# Patient Record
Sex: FEMALE | Race: WHITE
[De-identification: names, ages, dates, MRNs, and addresses within clinical notes are randomized per-mention and may not be internally consistent; named-entity substitution may affect disease eponyms.]

---

## 2017-06-25 ENCOUNTER — HOSPITAL ENCOUNTER (INPATIENT)
Dept: HOSPITAL 68 - ERH | Age: 47
LOS: 2 days | DRG: 607 | End: 2017-06-27
Attending: INTERNAL MEDICINE | Admitting: INTERNAL MEDICINE
Payer: COMMERCIAL

## 2017-06-25 VITALS — BODY MASS INDEX: 39.27 KG/M2 | HEIGHT: 64 IN | WEIGHT: 230 LBS

## 2017-06-25 VITALS — SYSTOLIC BLOOD PRESSURE: 158 MMHG | DIASTOLIC BLOOD PRESSURE: 98 MMHG

## 2017-06-25 DIAGNOSIS — E78.5: ICD-10-CM

## 2017-06-25 DIAGNOSIS — J45.909: ICD-10-CM

## 2017-06-25 DIAGNOSIS — N83.202: ICD-10-CM

## 2017-06-25 DIAGNOSIS — L40.9: ICD-10-CM

## 2017-06-25 DIAGNOSIS — F17.200: ICD-10-CM

## 2017-06-25 DIAGNOSIS — M54.16: ICD-10-CM

## 2017-06-25 DIAGNOSIS — E66.9: ICD-10-CM

## 2017-06-25 DIAGNOSIS — E11.9: ICD-10-CM

## 2017-06-25 DIAGNOSIS — R21: Primary | ICD-10-CM

## 2017-06-25 DIAGNOSIS — B37.3: ICD-10-CM

## 2017-06-25 DIAGNOSIS — Z79.4: ICD-10-CM

## 2017-06-25 DIAGNOSIS — I10: ICD-10-CM

## 2017-06-25 LAB
ABSOLUTE GRANULOCYTE CT: 11.1 /CUMM (ref 1.4–6.5)
ABSOLUTE GRANULOCYTE CT: 7.6 /CUMM (ref 1.4–6.5)
BASOPHILS # BLD: 0 /CUMM (ref 0–0.2)
BASOPHILS # BLD: 0 /CUMM (ref 0–0.2)
BASOPHILS NFR BLD: 0.1 % (ref 0–2)
BASOPHILS NFR BLD: 0.3 % (ref 0–2)
EOSINOPHIL # BLD: 0 /CUMM (ref 0–0.7)
EOSINOPHIL # BLD: 0.1 /CUMM (ref 0–0.7)
EOSINOPHIL NFR BLD: 0.1 % (ref 0–5)
EOSINOPHIL NFR BLD: 0.7 % (ref 0–5)
ERYTHROCYTE [DISTWIDTH] IN BLOOD BY AUTOMATED COUNT: 13 % (ref 11.5–14.5)
ERYTHROCYTE [DISTWIDTH] IN BLOOD BY AUTOMATED COUNT: 13.5 % (ref 11.5–14.5)
GRANULOCYTES NFR BLD: 81.8 % (ref 42.2–75.2)
GRANULOCYTES NFR BLD: 92.7 % (ref 42.2–75.2)
HCT VFR BLD CALC: 43.3 % (ref 37–47)
HCT VFR BLD CALC: 43.8 % (ref 37–47)
LYMPHOCYTES # BLD: 0.6 /CUMM (ref 1.2–3.4)
LYMPHOCYTES # BLD: 1 /CUMM (ref 1.2–3.4)
MCH RBC QN AUTO: 31.6 PG (ref 27–31)
MCH RBC QN AUTO: 31.6 PG (ref 27–31)
MCHC RBC AUTO-ENTMCNC: 33.9 G/DL (ref 33–37)
MCHC RBC AUTO-ENTMCNC: 34 G/DL (ref 33–37)
MCV RBC AUTO: 92.8 FL (ref 81–99)
MCV RBC AUTO: 93.3 FL (ref 81–99)
MONOCYTES # BLD: 0.2 /CUMM (ref 0.1–0.6)
MONOCYTES # BLD: 0.6 /CUMM (ref 0.1–0.6)
PLATELET # BLD: 161 /CUMM (ref 130–400)
PLATELET # BLD: 168 /CUMM (ref 130–400)
PMV BLD AUTO: 9.6 FL (ref 7.4–10.4)
PMV BLD AUTO: 9.6 FL (ref 7.4–10.4)
RED BLOOD CELL CT: 4.65 /CUMM (ref 4.2–5.4)
RED BLOOD CELL CT: 4.72 /CUMM (ref 4.2–5.4)
WBC # BLD AUTO: 12 /CUMM (ref 4.8–10.8)
WBC # BLD AUTO: 9.4 /CUMM (ref 4.8–10.8)

## 2017-06-25 PROCEDURE — 2NASP: CPT

## 2017-06-25 PROCEDURE — 86618 LYME DISEASE ANTIBODY: CPT

## 2017-06-25 NOTE — CT SCAN REPORT
EXAMINATION:
CT ABDOMEN AND PELVIS WITH CONTRAST
 
CLINICAL INFORMATION:
Left lower quadrant pain.
 
COMPARISON:
Previous CT scan July 2016 and pelvic ultrasound from earlier the same day.
 
TECHNIQUE:
Multidetector volumetric imaging was performed of the abdomen and pelvis
before and after the IV administration of 95 mL of Optiray 320 intravenous
contrast. Sagittal and coronal reformatted images were obtained on the
technologist's workstation.
 
DLP:
1005 mGy-cm.
 
FINDINGS:
 
LUNG BASES: The visualized lung bases are unremarkable.
 
LIVER, GALLBLADDER, AND BILIARY TREE: The liver is normal in size, shape, and
attenuation. No focal hepatic lesion or biliary ductal dilatation is present.
The gallbladder has been removed.
 
PANCREAS: Unremarkable.
 
SPLEEN: Unremarkable.
 
ADRENAL GLANDS: Unremarkable.
 
KIDNEYS AND URETERS: There is a 2.7 x 3.2 cm cyst exophytic to the upper pole
of the left kidney. The kidneys are otherwise unremarkable.
 
BLADDER: Unremarkable.
 
GASTROINTESTINAL TRACT: The small and large bowel are unremarkable. The
appendix is not identified with certainty. No evidence of appendicitis is
seen.
 
ABDOMINAL WALL: There is a small umbilical hernia containing fat.
 
LYMPH NODES: There are no enlarged lymph nodes seen. There is no ascites.
 
VASCULAR: Unremarkable.
 
PELVIC VISCERA: There is a heterogeneous predominantly high attenuation area
in the left adnexa measuring 2 x 2.5 cm probably corresponding to hemorrhagic
cyst seen on ultrasound. The uterus and adnexa are otherwise unremarkable.
 
OSSEOUS STRUCTURES: There is spondylolysis, grade 1 spondylolisthesis and
degenerative disc disease at L5-S1.
 
IMPRESSION:
There is a 2 x 2.5 cm heterogeneous high attenuation area in the left ovary
likely corresponding to a complex or hemorrhagic left ovarian cyst as seen by
ultrasound. No evidence of diverticulosis or diverticulitis. Left renal cyst.

## 2017-06-25 NOTE — ULTRASOUND REPORT
EXAMINATION:
ULTRASOUND OF THE PELVIS
 
CLINICAL INFORMATION:
46-year-old female presented with left lower quadrant pain. History of
ovarian cyst.
 
COMPARISON:
CT of the abdomen and pelvis done on 07/15/2016.
 
TECHNIQUE:
Transabdominal and transvaginal pelvic ultrasound.
 
FINDINGS:
The uterus is normal in size and appearance, measuring 7.4 x 3.7 x 4.5 cm cm
longitudinally, anteroposteriorly and transversely. The endometrial stripe
thickness is normal, measuring 0.5 centimeters in thickness. The cervical
length measures 2.8 cm. No focal myometrial mass is seen.
 
The right ovary measures 3.2 x 1.5 x 2.5 cm, volume of 6.3 mL, shows a
dominant solitary follicle measuring 1.5 x 1.1 x 1.4 cm.
 
The left ovary measures 3.6 x 2.0 x 2.6 cm a volume of 9.8 mL. Superimposed
well-circumscribed hypoechoic echogenicity is noted within the left ovary
showing low level uniform smooth homogenous echoes measuring 2.4 x 1.4 x 2.3
cm, most consistent with hemorrhagic ovarian follicle.
 
Vascular flow to both ovaries is well maintained.
 
There is no free fluid present.
 
A transvaginal study was performed in addition to the transabdominal study
which did not yield an adequate examination of the uterus and ovaries due to
superimposed distended gas-filled loops of bowel.
 
IMPRESSION:
1. Sonographically unremarkable uterus and normal-appearing right ovary.
 
2. Likely hemorrhagic follicle involving the left ovary. The size of the left
ovary appears relatively stable since 07/16/2016.
 
3. No free fluid.

## 2017-06-25 NOTE — HISTORY & PHYSICAL
SINAN LOWE 17:
General Information and HPI
MD Statement:
I have seen and personally examined NARENDRA OBRIEN and documented this H&
P.
 
The patient is a 46 year old F who presented with a patient stated chief 
complaint of abdominal pain and rash
 
Source of Information: patient
Exam Limitations: no limitations
History of Present Illness:
46 year old woman, current everyday smoker with Pmh significant for DM, 
recurrent breast infections usually treated with Doxycline, history left ovarian
cyst, multiple allergies, psoriasis comes to ED for evaluation for Abdominal 
pain and rash.
 
Around 8pm she started experiencing  non radiating LLQ abdominal pain which was 
10/10 and after administration of motrin decreased to 8/10. Was associated with 
nausea.  Also around the same time she noticed a rash on her lower abdomen which
during her ER stay was noted to spread upwards to mid chest. Rash  was non 
pruritic and associated with burning.  Endorses white vaginal discharge which 
started today as well. 
 
Denies shortness of breath, chest pain, palpitatations, vomiting, change in diet
or detergent, excessive menstrual bleeding,  reports spotting in between her 
periods.
 
 
Of note  which was done in  was complicated infection of the 
surgical site. She is followed by Dr. Fonseca for her current left ovarian cyst 
and she has had prior removal of what she states was a 9 cm ovarian cyst in the 
left ovary. 
 
Her only change in her medications was Onglyza which she recently re-started 3 
months ago after stopping for over a year ago during her pregnancy.
 
Allergies/Medications
Allergies:
Coded Allergies:
Citrus And Derivatives (Severe, ANAPHYLAXIS 16)
ciprofloxacin (From CIPRO HC) (Severe, ANAPHYLAXIS 16)
hydrocortisone (From CIPRO HC) (Severe, ANAPHYLAXIS 16)
Macrolide Antibiotics (UNKNOWN 16)
adhesive tape (BLISTERS 16)
cefadroxil (UNKNOWN 16)
clindamycin (HIVES 16)
erythromycin base (UNKNOWN 16)
penicillin G (UNKNOWN 16)
pioglitazone (RASH 16)
sitagliptin (From JANUVIA) (RASH 16)
sulfamethoxazole (UNKNOWN 16)
venom-honey bee (BEE VENOM (HONEY BEE)) (HIVES 16)
morphine (NAUSEA 16)
 
Home Med list
Doxycycline Hyclate 100 MG TABLET   1 TAB PO BID rash
Fluconazole (Diflucan) 150 MG TABLET   1 TAB PO ONCE yeast infection
Glipizide (Glipizide ER) 5 MG TAB.ER.24   2 TAB PO QPM DIABETES  (Reported)
Hydrocortisone 1 % CREAM..G.   1 KENDRA TOP BID rash
     apply to affected area(s)
Lisinopril (Prinivil) 5 MG TABLET   1 TAB PO DAILY HEART  (Reported)
Metformin HCl (Metformin HCl ER) 500 MG TAB.ER.24H   2 TAB PO BID DIABETES  (
Reported)
Oxycodone HCl/Acetaminophen (Percocet 5-325 MG Tablet) 5 MG-325 MG TABLET   1 
TAB PO BID PRN pain
Saxagliptin (Onglyza) 5 MG TABLET   1 TAB PO QPM DIABETES  (Reported)
Simvastatin (Simvastatin*) 40 MG TABLET   1 TAB PO QPM CHOLESTEROL  (Reported)
 
Compliance With Home Meds: GOOD
 
Past History
 
Travel History
Traveled to [place] past 21 day No
 
Medical History
Blood Transfusion Hx: No
Neurological: NONE
EENT: NONE
Cardiovascular: hypertension
Respiratory: asthma
Gastrointestinal: NONE
Hepatic: NONE
Renal: NONE
Musculoskeletal: NONE
Psychiatric: NONE
Endocrine: diabetes
Cancer(s): RT BREAST LUMPECTOMY
GYN/Reproductive: OVARIAN CYSTS
 
Surgical History
Surgical History: , CYST REMOVAL
 
Past Family/Social History
 
Family History
Relations & Conditions if any
Relation not specified for:
  Diabetes mellitus in father
 
 
Psychosocial History
Where do you live? Home
Who Do You Live With? spouse
Services at Home: None
Smoking Status: Current Everyday Smoker
ETOH Use: denies use
 
Functional Ability
ADLs
Independent: dressing, eating, toileting, bathing. 
Ambulation: independent
IADLs
Independent: shopping, housework, finances, food prep, telephone, transportation
, medication admin. 
 
Review of Systems
 
Review of Systems
Constitutional:
Denies: see HPI, chills, diaphoresis, fever, malaise, weakness, unexplained 
weight loss. 
Cardiovascular:
Denies: chest pain, edema, orthopena, palpitations, peripheral edema, syncope. 
Respiratory:
Denies: cough, hemoptysis, orthopnea, short of breath, sputum production, 
stridor, wheezing. 
GI:
Reports: abdominal pain.  Denies: bloating, constipation, diarrhea, distention, 
bowel incontinence, melena, nausea, bloody stool, changes in stool, vomiting, 
steatorrhea. 
Genitourinary:
Denies: discharge, dysuria, frequency, hematuria, hesitation, nocturia, pain, 
urgency. 
Skin:
Reports: rash. 
 
Exam & Diagnostic Data
Last 24 Hrs of Vital Signs/I&O
 Vital Signs
 
 
Date Time Temp Pulse Resp B/P B/P Pulse O2 O2 Flow FiO2
 
     Mean Ox Delivery Rate 
 
 2314      96 Room Air  
 
 2234 98.1 107 20 158/98  96 Room Air  
 
 2157       Room Air  
 
 2048 97.2 94 18 142/93  95 Room Air Room Air 
 
 1700 99.1 99 22 138/72  100 Room Air  
 
 1500  100 22 132/70  99   
 
 1259 98.9 109 16 135/78  94 Room Air  
 
 1017 99.7 110 18 153/84  97 Room Air  
 
 
 Intake & Output
 
 
  0800  0000  1600
 
Intake Total  240 
 
Output Total   
 
Balance  240 
 
    
 
Intake, Oral  240 
 
Patient  230 lb 230 lb
 
Weight   
 
Weight  Reported by Patient Reported by Patient
 
Measurement   
 
Method   
 
 
 
 
Physical Exam
General Appearance Alert, Oriented X3, Cooperative, Mild Distress
Skin red, blanchable maculopapular rash extending over abdomen upto chest , 
psoriatic lesions on b/l lower extremities
Neck Supple
Cardiovascular Regular Rate, Normal S1, Normal S2
Lungs Clear to Auscultation, Normal Air Movement
Abdomen Normal Bowel Sounds, tenderness to mild palpation, worse in LLQ with 
guarding
Extremities No Edema
 
Diagnostic Data
Other Results
US-TRANSVAGINAL
FINDINGS:
The uterus is normal in size and appearance, measuring 7.4 x 3.7 x 4.5 cm cm
longitudinally, anteroposteriorly and transversely. The endometrial stripe
thickness is normal, measuring 0.5 centimeters in thickness. The cervical
length measures 2.8 cm. No focal myometrial mass is seen.
 
The right ovary measures 3.2 x 1.5 x 2.5 cm, volume of 6.3 mL, shows a
dominant solitary follicle measuring 1.5 x 1.1 x 1.4 cm.
 
The left ovary measures 3.6 x 2.0 x 2.6 cm a volume of 9.8 mL. Superimposed
well-circumscribed hypoechoic echogenicity is noted within the left ovary
showing low level uniform smooth homogenous echoes measuring 2.4 x 1.4 x 2.3
cm, most consistent with hemorrhagic ovarian follicle.
 
Vascular flow to both ovaries is well maintained.
 
There is no free fluid present.
 
A transvaginal study was performed in addition to the transabdominal study
which did not yield an adequate examination of the uterus and ovaries due to
superimposed distended gas-filled loops of bowel.
 
IMPRESSION:
1. Sonographically unremarkable uterus and normal-appearing right ovary.
 
2. Likely hemorrhagic follicle involving the left ovary. The size of the left
ovary appears relatively stable since 2016.
 
3. No free fluid.
 
 CT ABD & PELVIS W IV CONTRAST
 
FINDINGS:
 
LUNG BASES: The visualized lung bases are unremarkable.
 
LIVER, GALLBLADDER, AND BILIARY TREE: The liver is normal in size, shape, and
attenuation. No focal hepatic lesion or biliary ductal dilatation is present.
The gallbladder has been removed.
 
PANCREAS: Unremarkable.
 
SPLEEN: Unremarkable.
 
ADRENAL GLANDS: Unremarkable.
 
KIDNEYS AND URETERS: There is a 2.7 x 3.2 cm cyst exophytic to the upper pole
of the left kidney. The kidneys are otherwise unremarkable.
 
BLADDER: Unremarkable.
 
GASTROINTESTINAL TRACT: The small and large bowel are unremarkable. The
appendix is not identified with certainty. No evidence of appendicitis is
seen.
 
ABDOMINAL WALL: There is a small umbilical hernia containing fat.
 
LYMPH NODES: There are no enlarged lymph nodes seen. There is no ascites.
 
VASCULAR: Unremarkable.
 
PELVIC VISCERA: There is a heterogeneous predominantly high attenuation area
in the left adnexa measuring 2 x 2.5 cm probably corresponding to hemorrhagic
cyst seen on ultrasound. The uterus and adnexa are otherwise unremarkable.
 
OSSEOUS STRUCTURES: There is spondylolysis, grade 1 spondylolisthesis and
degenerative disc disease at L5-S1.
 
IMPRESSION:
There is a 2 x 2.5 cm heterogeneous high attenuation area in the left ovary
likely corresponding to a complex or hemorrhagic left ovarian cyst as seen by
ultrasound. No evidence of diverticulosis or diverticulitis. Left renal cyst.
 
Assessment/Plan
Assessment:
46 year old woman, current everyday smoker with Pmh significant for DM, 
recurrent breast infections usually treated with Doxycline, history left ovarian
cyst, multiple allergies, psoriasis comes to ED for evaluation for Abdominal 
pain and rash. Labs significant for mildly elevated leukcocytosis, elevated 
serum glucose and glycosuria.  CT ABD/pelvis pertinant for 2 x 2.5cm left 
hemorrhagic cyst confirming finding on Transvaginal US. 
 
ED course: received one dose of Doxycline, IV solumedrol and Diflucan.
 
As Ranked By This Provider
Problem List:
 1. Hemorrhagic cyst of left ovary
   Assessment/Plan
Called and spoke to Dr. Boudreaux the on call OBG/GYN who reviewed Imaging. 
Recomended to continue conservative management and follow up with Dr. Fonseca upon
discharge. 
Monitor for acute abdomen. 
 
 2. Rash and nonspecific skin eruption
   Assessment/Plan
unclear etiology at this time, clinical examination not impressive for 
cellulitis, improvement noted after administration of IV solumedrol.
Will continue with scheduled Benadryl 50mg q6, monitor progress
Dermatologic consult to be placed in AM
In view of her past history recurrent infections, pt is anxious not to be on 
Antibiotics will continue with cefazolin pending blood cultures
 
 3. Diabetes mellitus type 2
   Assessment/Plan
accuchecks, Novolog SS
hold oral hypoglycemics
f/up HA1c
continue lisinopril and statin 
Diabetic diet
 
 
 
 4. DVT prophylaxis
   Assessment/Plan
sc lovenox
 
 5. Full code status
 
 
Core Measures/Miscellaneous
 
Acute Coronary Syndrome
ACS Diagnosis: No
 
Cerebrovascular Accident
CVA/TIA Diagnosis: No
 
Congestive Heart Failure
CHF Diagnosis: No
 
VTE (View Protocol)
VTE Risk Factors: Age > 40
[facility] d/t: No contraindications
No VTE Pharm Prophylaxis d/t: No contraindications
VTE Diagnosis: No
VTE Type: NONE
VTE Confirmed by (Test): NONE
 
Sepsis (View Protocol)
Severe Sepsis Present: No
 
Septic Shock
Septic Shock Present: No
 
Miscellaneous Documentation
Attending Case Discussed With:
ROSA VICKERS DO
 
Primary Care Physician:
JODI BARRERA MD
 
Patient sees these Specialists
Dr. Manas Fonseca
 
Level of Patient Care: General Medicine
 
 
LEOPOLDO NDIAYE MD 17 0317:
Resident Review Statement
Resident Statement: examined this patient, discussed with intern, agreed with 
intern, discussed with family
Other Findings:
46-year-old female with past medical history of psoriasis multiple drug and 
seasonal allergies, diabetes, hypertension and left ovarian cyst here with 
complains of sudden onset of severe, left-sided lower abdominal pain and a new 
painful red rash on her lower abdomen x 1 day and a copious clear-colored 
vaginal discharge and vaginal itching that started today.  She endorses low-
grade fevers, chills, and sweats. She reports that since ariving in the ER, the 
rash rapidly spread up across abdomen up to the middle of the breasts but became
a little better after she was given IV steroids.
 
She also asked that she has had chronic sinus and congestion for the past 3 
months and now has a cough productive of white and yellow sputum for the last 5 
days with associated wheezing without chest pain or shortness of breath.
 
She had a  section was complicated by an infection of the surgical site.
 She denies a hx of endometritis. Last year and reports that a large cyst was 
taken out of her left ovary prior to her last pregnancy, and she currently has a
cyst in her left ovary that has been there throughout her pregnancy for which 
her OB/GYN Dr. Sewell has been monitoring it. She had no pain prior to now and 
she does not believe there was any blood in the cyst prior.  She has had 
multiple breast infections in the past requiring surgical interventions as well 
as vaginal yeast infections in the past.
 
She takes Onglyza which she recently re-started 3 months ago after stopping for 
over a year ago when she was pregnant, metformin and glipizide for her diabetes 
and follows up with Dr. Malagon. Her blood sugars have been well controlled until 
yesterday when they were elevated.
 
Transvaginal ultrasound scan:
1. Sonographically unremarkable uterus and normal-appearing right ovary.
2. Likely hemorrhagic follicle involving the left ovary. The size of the left
ovary appears relatively stable since 2016.
3. No free fluid.
 
CT abdomen pelvis with IV contrast:
There is a 2 x 2.5 cm heterogeneous high attenuation area in the left ovary
likely corresponding to a complex or hemorrhagic left ovarian cyst as seen by
ultrasound. No evidence of diverticulosis or diverticulitis. Left renal cyst.
 
Assessment
1.  Possible Drug allergy versus cellulitis
2.  Vaginal candidiasis
3.  Possible Bronchitis
4.  Hemorrhagic transformation of left Ovarian cyst
4.  T2 DM
5.   HTN
 
 
Plan
Admit to general medicine floor
Blood cultures 2
IV cefazolin 1 g every 8hrs
Sputum culture
TRC nebs
By mouth Benadryl 50 mg every 6 hours
Hold steroids for now-patient received 125 mg of IV Solu-Medrol in the ER
Dermatology consult
Monitor CBCs
Hold all her oral hypoglycemics for now
Subcutaneous Levemir 10 mg twice a day-may titrate upwards as needed
NovoLog sliding scale
Fingersticks glucose 3 times a day before meals and at bedtime
Consider Endo consult if blood sugar becomes difficult to control
Check hemoglobin A1c
Adequate pain control
Full code
Subcutaneous Lovenox for DVT prophylaxis
 
 
ARGELIA FREEMAN 17 0431:
Attending MD Review Statement
 
Attending Statement
Attending MD Statement: examined this patient, discuss w/resident/PA/NP, agreed 
w/resident/PA/NP, discussed with family, reviewed EMR data (avail), reviewed 
images, amended to note
Attending Assessment/Plan:
 
CC: pain in left "ovary"
PMH: DM, HLD, psoriasis, asthma, migraine, recurrent breast infection
 
Patient came to ER complaining of pain in the left-sided ovary. Upon further 
conditioning she complains of pain in left lower quadrant, sharp, radiating to 
center of lower abdomen. She noticed pain last night, which worsened the morning
of admission so she came to ER. She endorses some nausea associated with that 
but denies any vomiting. Then she noticed rash on lower abdomen, patient was 
kept in ER in observation for the day and eventually rash worsened spreading 
upwards beyond the umbilicus up to the sternum. Patient complains severe burning
sensation over the rash, denies any itching. "I feel the cellulitis, this is not
allergy". She endorses chills but no subjective or objective fevers. She also 
noticed white vaginal discharge referring to fungal infection. She has several 
pictures taken on her cell phone poor progression of the rash. 
Vitals: Afebrile, BP max 99.7, , RR 18, blood pressure 135/78, saturating 
well on room air.
 
On examination: A O 3, cooperative, no acute distress, neck supple, JVD normal,
no inguinal lymphadenopathy, mucosa moist, no focal neurological deficit, no 
dependent edema, so reticulocyte lesions bilateral lower extremity, 
maculopapular blanching rash lower abdomen spreading up to the sternum, upper 
part of the rash appears resolving. Mildly increased temperature, no induration.
No other rashes observed, CVS: S1-S2, RRR. RS: Minimum wheezing. abdomen: Soft, 
mild tenderness, obese, no guarding or rigidity, tender to touch in left 
inguinal region, ND, bowel sounds present. 
 
Labs: WBC 12.0, neutrophils 92%, sodium 135, potassium 4.2, chloride 100, 
bicarbonate 27, anion gap 8, glucose 313, AST 12, lipase 220, lactate 1.1
 
UA unremarkable except high glucose
Transvaginal and transabdominal ultrasound: 
1. Sonographically unremarkable uterus and normal-appearing right ovary. 
2. Likely hemorrhagic follicle involving the left ovary. The size of the left 
ovary appears relatively stable since 2016. 
3. No free fluid.
 
CT abdo and pelvis : 
There is a 2 x 2.5 cm heterogeneous high attenuation area in the left ovary 
likely corresponding to a complex or hemorrhagic left ovarian cyst as seen by 
ultrasound. No evidence of diverticulosis or diverticulitis. Left renal cyst. 
 
A and P 
 
46 year old female with history of diabetes mellitus and psoriasis presented in 
ER for sharp left-sided lower abdominal p a in. Patient hemodynamically stable. 
She states that pain is severe, does not allow much examination, appears tender 
to touch but no guarding or rigidity. Transvaginal ultrasound and CT scan were 
obtained which showed possible hemorrhagic transformation or existing ovarian 
cyst. Because of her severe pain OB/GYN was called, who suggested conservative 
management. At the same time patient noticed rash on lower abdomen starting 
below umbilicus spreading upwards, rashes maculopapular, blanching. Questionable
allergy less likely infection/ cellulitis.  It does not appear to be purpuric.  
Less likely other chronic inflammations as rash is very acute in nature.  
Patient states that she gets recurrent cellulitis and had infection of her 
breast 11 times, infection of her  incision. She insist on getting 
antibiotics. She also complains of vaginal discharge and received 1 dose of 
fluconazole in ER. She also received ketorolac, Tylenol, doxycycline, 
methylprednisolone 125 mg, normal saline in ER.  Patient is admitted to general 
medicine, for worsening abdominal rash. We will continue cefazolin, scheduled 
Benadryl, dermatologic consult, follow-up blood cultures, hold all her oral 
hypoglycemics, continue sliding scale insulin, continue lisinopril.

## 2017-06-25 NOTE — ED GI/GU/ABDOMINAL COMPLAINT
History of Present Illness
 
General
Chief Complaint: Abdominal Pain/Flank Pain
Stated Complaint: ABD PAIN, REDNESS AND SWELLING
Source: patient, family
Exam Limitations: no limitations
 
Vital Signs & Intake/Output
Vital Signs & Intake/Output
 Vital Signs
 
 
Date Time Temp Pulse Resp B/P B/P Pulse O2 O2 Flow FiO2
 
     Mean Ox Delivery Rate 
 
 1131       Room Air  
 
 1130      96 Room Air  
 
 0815  101  142/90     
 
 0800       Room Air  
 
 0720 98.0 101 20 142/90  97 Room Air  
 
 2314      96 Room Air  
 
 2234 98.1 107 20 158/98  96 Room Air  
 
 2157       Room Air  
 
 2048 97.2 94 18 142/93  95 Room Air Room Air 
 
 1700 99.1 99 22 138/72  100 Room Air  
 
 1500  100 22 132/70  99   
 
 1259 98.9 109 16 135/78  94 Room Air  
 
 
 ED Intake and Output
 
 
  0000  1200
 
Intake Total 240 
 
Output Total  
 
Balance 240 
 
   
 
Intake, Oral 240 
 
Patient 230 lb 230 lb
 
Weight  
 
Weight Reported by Patient Reported by Patient
 
Measurement  
 
Method  
 
 
Allergies
Coded Allergies:
Citrus And Derivatives (Severe, ANAPHYLAXIS 16)
ciprofloxacin (From CIPRO HC) (Severe, ANAPHYLAXIS 16)
hydrocortisone (From CIPRO HC) (Severe, ANAPHYLAXIS 16)
Macrolide Antibiotics (UNKNOWN 16)
adhesive tape (BLISTERS 16)
cefadroxil (UNKNOWN 16)
clindamycin (HIVES 16)
erythromycin base (UNKNOWN 16)
penicillin G (UNKNOWN 16)
pioglitazone (RASH 16)
sitagliptin (From JANUVIA) (RASH 16)
sulfamethoxazole (UNKNOWN 16)
venom-honey bee (BEE VENOM (HONEY BEE)) (HIVES 16)
morphine (NAUSEA 16)
 
Reconcile Medications
Doxycycline Hyclate 100 MG TABLET   1 TAB PO BID rash
Fluconazole (Diflucan) 150 MG TABLET   1 TAB PO ONCE yeast infection
Glipizide (Glipizide ER) 5 MG TAB.ER.24   2 TAB PO QPM DIABETES  (Reported)
Hydrocortisone 1 % CREAM..G.   1 KENDRA TOP BID rash
     apply to affected area(s)
Lisinopril (Prinivil) 5 MG TABLET   1 TAB PO DAILY HEART  (Reported)
Metformin HCl (Metformin HCl ER) 500 MG TAB.ER.24H   2 TAB PO BID DIABETES  (
Reported)
Oxycodone HCl/Acetaminophen (Percocet 5-325 MG Tablet) 5 MG-325 MG TABLET   1 
TAB PO BID PRN pain
Saxagliptin (Onglyza) 5 MG TABLET   1 TAB PO QPM DIABETES  (Reported)
Simvastatin (Simvastatin*) 40 MG TABLET   1 TAB PO QPM CHOLESTEROL  (Reported)
 
Triage Note:
47 YO FEMALE TO ER C/O L SIDED OVARY PAIN AND PAIN
 TO CENTER OF LOWER ABD (NOTED WITH REDNESS TO ABD)
 PER PT, THE ABD IS PAINFUL TO TOUCH. +NAUSEA,
 DENIES V/D.
Triage Nurses Notes Reviewed? yes
LMP (ages 10-50): MAY
Onset: Abrupt
Duration: day(s):
Timing: recent history
Quality/Severity: aching, severe, throbbing
Severity Numbers: 10
Location: left lower quadrant
HPI:
47yo female with hx of endometreitis follow  last year, left ovarian 
cyst, and DM presents to ED complaining of left lower abdominal pain since last 
night. Patient describes pain as constant, trobbing, aching, and severe at 10/
10. She states this pain feels similar to pain she had with endometritis.  She 
took Motrin last night without relief. She also complains of spreading rash 
across lower abdomen, worse since last night. She also c/o labia pruritis which 
feels similar to previous yeast infections with thick white vaginal discharge. 
Her LMP was May 14th. LBM was yesterday and normal.  She denies fevers, chills, 
dyspnea, diarrhea, constipation, dysuria.
(YA RANKIN PA-C)
Pregnant? N
Is pt currently breastfeeding? No
(ROSA VICKERS DO)
 
Past History
 
Travel History
Traveled to [place] past 21 day No
 
Medical History
Any Pertinent Medical History? see below for history
Neurological: NONE
EENT: NONE
Cardiovascular: hypertension
Respiratory: asthma
Gastrointestinal: NONE
Hepatic: NONE
Renal: NONE
Musculoskeletal: NONE
Psychiatric: NONE
Endocrine: diabetes
 
Surgical History
Surgical History: 
 
Psychosocial History
Who do you live with Spouse
What is your primary language English
Tobacco Use: Current Daily Use
Daily Tobacco Use Amount/Type: => 5 Cigarettes daily
 
Family History
Hx Contributory? No
(YA RANKIN PA-C)
 
Review of Systems
 
Review of Systems
Constitutional:
Reports: no symptoms. 
EENTM:
Reports: no symptoms. 
Respiratory:
Reports: no symptoms. 
Cardiovascular:
Reports: no symptoms. 
GI:
Reports: see HPI. 
Genitourinary:
Reports: see HPI. 
Musculoskeletal:
Reports: no symptoms. 
Skin:
Reports: see HPI. 
Neurological/Psychological:
Reports: no symptoms. 
Hematologic/Endocrine:
Reports: no symptoms. 
Immunologic/Allergic:
Reports: no symptoms. 
All Other Systems: Reviewed and Negative
(YA RANKIN PA-C)
 
Physical Exam
 
Physical Exam
General Appearance: well developed/nourished, no apparent distress, alert, awake
, anxious
Head: atraumatic, normal appearance
Eyes:
Bilateral: normal appearance, EOMI. 
Ears, Nose, Throat, Mouth: hearing grossly normal
Neck: normal inspection, supple, full range of motion
Respiratory: normal breath sounds, no respiratory distress, lungs clear
Cardiovascular: regular rate/rhythm, tachycardia
Gastrointestinal: normal bowel sounds, soft, no organomegaly, tenderness LLQ
Back: normal inspection, no cva tenderness bilaterally
Extremities: normal range of motion
Neurologic/Psych: awake, alert, oriented x 3
Skin: erythematous macular papular rash on lower abdomen, slight warmth to tough
, blanches with pressure
(YA RANKIN PA-C)
 
Core Measures
ACS in differential dx? No
Severe Sepsis Present: No
Septic Shock Present: No
(ROSA VICKERS DO)
 
Progress
Differential Diagnosis: appendicitis, bowel obstruction, diverticulitis, ovarian
cyst, ovarian torsion, pancreatitis, CELLULITIS, PSORIASIS, VASCULITIS, TICK 
BORNE ILLNESS, NECROTIZING FASCIITIS
Plan of Care:
 Orders
 
 
Procedure Date/time Status
 
Consistent Carbohydrate 3  B Active
 
RT: Evaluation  1131 Active
 
LOWER RESPIRATORY CULTURE  1000 Active
 
CULTURE,URINE  0721 Active
 
URINALYSIS  0721 Complete
 
GLYCOSYLATED HGB  0600 Complete
 
CBC WITHOUT DIFFERENTIAL  0600 Complete
 
BASIC ELECTROLYTES PLUS BUN&CR  06 Complete
 
THERAPIST ORDERS   UNK Complete
 
Lab Add-on Test   UNK Active
 
Heart Healthy Diet  D Complete
 
Pathway - chart  2201 Active
 
Teach/Educate 2147 Active
 
Pain Treatment and Response 2147 Active
 
Nutritional Intake, Monitor 2147 Active
 
Isolation 2147 Active
 
Patient Care Conference 06/25 2147 Active
 
Activity/Ambulation  2147 Active
 
LACTIC ACID  2039 Complete
 
Patient Data 2003 Active
 
Admit to inpatient  1950 Active
 
FingerStick- Glucose  1739 Complete
 
LACTIC ACID  1739 Complete
 
CBC WITHOUT DIFFERENTIAL  1739 Complete
 
Patient Data  1542 Active
 
Vital Signs  1542 Active
 
Code Status  1542 Active
 
Intake & Output  1540 Active
 
Add-on Test (ER Only)  1442 Active
 
LYME TITRE  1039 Active
 
FingerStick- Glucose  1035 Active
 
AEROSOL CHG   UNK Complete
 
TRC EVALUATION (GEN)   UNK Complete
 
House Staff   UNK Active
 
VTE Mechanical Prophylaxis   UNK Active
 
 
 Current Medications
 
 
  Sig/Luz Elena Start time  Last
 
Medication Dose  Stop Time Status Admin
 
Atorvastatin Calcium 20 MG 1700  1700 AC 
 
(Lipitor)     
 
Albuterol Sulfate 3 ML Q4P PRN  1130 AC 
 
(Proventil)     1128
 
Enoxaparin Sodium 40 MG DAILY  1000 AC 
 
(Lovenox)     
 
Lisinopril 5 MG DAILY  1000 AC 
 
(Prinivil)     0815
 
Insulin Aspart 0 TIDAC  0800 AC 
 
(NovoLOG)     0817
 
Cefazolin Sodium 1,000 MG IQ8  0000 AC 
 
(Kefzol-Ancef Inj)     0816
 
Guaifenesin 600 MG Q12  2231 AC 
 
(Mucinex)     0814
 
Diphenhydramine HCl 50 MG Q6 PRN  2230 AC 
 
(Benadryl)     2243
 
Doxycycline Hyclate 100 MG ONCE ONE  2215 CAN 
 
(Vibramycin)    2320  
 
Sodium Chloride 100 ML    
 
(Normal Saline 0.9%)     
 
Sodium Chloride 1,000 ML Q20H  2215 AC 
 
(Normal Saline 0.9%)    1814  2242
 
Insulin Detemir 10 UNITS BID  2201 AC 
 
(Levemir)     0816
 
Acetaminophen 650 MG Q6P PRN  2200 AC 
 
(Tylenol)     
 
Acetaminophen 1,000 MG Q6P PRN  2200 AC 
 
(Ofirmev)     0925
 
Ketorolac  15 MG Q6P PRN  2200 AC 
 
Tromethamine     0815
 
(Toradol)     
 
 
 Laboratory Tests
 
 
 
17 1000:
Urine Color YEL, Urine Clarity CLEAR, Urine pH 6.0, Ur Specific Gravity 1.020, 
Urine Protein NEG, Urine Ketones 40  H, Urine Nitrite NEG, Urine Bilirubin NEG, 
Urine Urobilinogen 0.2, Ur Leukocyte Esterase NEG, Ur Microscopic EXAM NOT 
REQUIRED, Urine Hemoglobin NEG, Urine Glucose >=1000  H
 
17 0710:
Anion Gap 14, Estimated GFR > 60, BUN/Creatinine Ratio 24.0, Hemoglobin A1c 10.3
 H, CBC w Diff NO MAN DIFF REQ, RBC 4.80, MCV 93.9, MCH 31.7  H, RDW 13.1, MPV 
10.0, Gran % 89.6  H, Lymphocytes % 7.6  L, Monocytes % 2.8, Eosinophils % 0, 
Basophils % 0  L, Absolute Granulocytes 10.9  H, Absolute Lymphocytes 0.9  L, 
Absolute Monocytes 0.3, Absolute Eosinophils 0, Absolute Basophils 0, PUBS MCHC 
33.7
 
17 2102:
Lactic Acid 0.9
 
17 1900:
Lactic Acid 1.1, CBC w Diff NO MAN DIFF REQ, RBC 4.72, MCV 92.8, MCH 31.6  H, 
RDW 13.0, MPV 9.6, Gran % 92.7  H, Lymphocytes % 5.1  L, Monocytes % 2.0, 
Eosinophils % 0.1, Basophils % 0.1, Absolute Granulocytes 11.1  H, Absolute 
Lymphocytes 0.6  L, Absolute Monocytes 0.2, Absolute Eosinophils 0, Absolute 
Basophils 0, PUBS MCHC 34.0
 Microbiology
 1000  URINE ROUT: Urine Culture - RECD
 1000  LOWER RESP: Respiratory Culture - COLB
 1000  LOWER RESP: Gram Stain - COLB
2255  LOWER RESP: Respiratory Culture - CAN
                Cancelled: NUMBER OF SQUAMOUS CELLS INDICATES POOR QUALITY 
SPECIMEN
2255  LOWER RESP: Gram Stain - CAN
                Cancelled: NUMBER OF SQUAMOUS CELLS INDICATES POOR QUALITY 
SPECIMEN
 
 
Patient given IV toradol and states improvement in her abdominal pain. 
Transvaginal ultrasound and CT abdomen/pelvis both show hemorrhagic cyst in left
ovary which may account for patient's abdominal pain. 
 
Upon further exam rash spreading further upward on patient's abdomen with some 
patches on chest now. Rash on upper abdomen and chest appears erythematous and 
lacy, nontender to palpation. Patient was discussed with Dr. Vickers. Decision for
ED obs after discussed with case management. Patient was started on IV doxy and 
IV solumedrol and lactic acid drawn.
 
Surgery PA consulted patient at bedside, suspicion for necretizing faciitis is 
low given patient's vital signs, she is afebrile, her WBCs are WNL, however not 
safe to discharge patient at this time with spreading rash.
 
Patient given oral diflucan for vaginal candidiasis.
 
Decision to admit made by Dr. Vickers and Dr. Hinkle based on patient's 
continue symptoms despite IV doxycyline and IV solumedrol.
(MASSIEL SUNG,YA)
Diagnostic Imaging:
Viewed by Me: CT Scan, Ultrasound.  Discussed w/RAD: CT Scan, Ultrasound. 
Radiology Impression: PATIENT: NARENDRA OBRIEN  MEDICAL RECORD NO: [id number] 
PRESENT AGE: 46  PATIENT ACCOUNT NO: [id number] : 70  LOCATION: [facility] 
ORDERING PHYSICIAN: YA RANKIN PA-C     SERVICE DATE:  EXAM TYPE: 
CAT - CT ABD & PELVIS W IV CONTRAST EXAMINATION: CT ABDOMEN AND PELVIS WITH 
CONTRAST CLINICAL INFORMATION: Left lower quadrant pain. COMPARISON: Previous CT
scan 2016 and pelvic ultrasound from earlier the same day. TECHNIQUE: 
Multidetector volumetric imaging was performed of the abdomen and pelvis before 
and after the IV administration of 95 mL of Optiray 320 intravenous contrast. 
Sagittal and coronal reformatted images were obtained on the technologist's 
workstation. DLP: 1005 mGy-cm. FINDINGS: LUNG BASES: The visualized lung bases 
are unremarkable. LIVER, GALLBLADDER, AND BILIARY TREE: The liver is normal in 
size, shape, and attenuation. No focal hepatic lesion or biliary ductal 
dilatation is present. The gallbladder has been removed. PANCREAS: Unremarkable.
SPLEEN: Unremarkable. ADRENAL GLANDS: Unremarkable. KIDNEYS AND URETERS: There 
is a 2.7 x 3.2 cm cyst exophytic to the upper pole of the left kidney. The 
kidneys are otherwise unremarkable. BLADDER: Unremarkable. GASTROINTESTINAL 
TRACT: The small and large bowel are unremarkable. The appendix is not 
identified with certainty. No evidence of appendicitis is seen. ABDOMINAL WALL: 
There is a small umbilical hernia containing fat. LYMPH NODES: There are no 
enlarged lymph nodes seen. There is no ascites. VASCULAR: Unremarkable. PELVIC 
VISCERA: There is a heterogeneous predominantly high attenuation area in the 
left adnexa measuring 2 x 2.5 cm probably corresponding to hemorrhagic cyst seen
on ultrasound. The uterus and adnexa are otherwise unremarkable. OSSEOUS 
STRUCTURES: There is spondylolysis, grade 1 spondylolisthesis and degenerative 
disc disease at L5-S1. IMPRESSION: There is a 2 x 2.5 cm heterogeneous high 
attenuation area in the left ovary likely corresponding to a complex or 
hemorrhagic left ovarian cyst as seen by ultrasound. No evidence of 
diverticulosis or diverticulitis. Left renal cyst. DICTATED BY: CINDY EMMANUEL MD  DATE/TIME DICTATED:17 :RAD.PRATHER  DATE/TIME 
TRANSCRIBED:17 CONFIDENTIAL, DO NOT COPY WITHOUT APPROPRIATE 
AUTHORIZATION.  <Electronically signed in Other Vendor System>                  
                                                                     SIGNED BY: 
CINDY EMMANUEL MD 17 1432, PATIENT: NARENDRA OBRIEN  MEDICAL RECORD 
NO: [id number] PRESENT AGE: 46  PATIENT ACCOUNT NO: [id number] : 70  LOCATION:
[facility] ORDERING PHYSICIAN: ROSA VICKERS DO (TBS)     SERVICE DATE:  
EXAM TYPE: US - US-TRANSVAGINAL EXAMINATION: ULTRASOUND OF THE PELVIS CLINICAL 
INFORMATION: 46-year-old female presented with left lower quadrant pain. History
of ovarian cyst. COMPARISON: CT of the abdomen and pelvis done on 07/15/2016. 
TECHNIQUE: Transabdominal and transvaginal pelvic ultrasound. FINDINGS: The 
uterus is normal in size and appearance, measuring 7.4 x 3.7 x 4.5 cm cm 
longitudinally, anteroposteriorly and transversely. The endometrial stripe 
thickness is normal, measuring 0.5 centimeters in thickness. The cervical length
measures 2.8 cm. No focal myometrial mass is seen. The right ovary measures 3.2 
x 1.5 x 2.5 cm, volume of 6.3 mL, shows a dominant solitary follicle measuring 
1.5 x 1.1 x 1.4 cm. The left ovary measures 3.6 x 2.0 x 2.6 cm a volume of 9.8 
mL. Superimposed well-circumscribed hypoechoic echogenicity is noted within the 
left ovary showing low level uniform smooth homogenous echoes measuring 2.4 x 
1.4 x 2.3 cm, most consistent with hemorrhagic ovarian follicle. Vascular flow 
to both ovaries is well maintained. There is no free fluid present. A 
transvaginal study was performed in addition to the transabdominal study which 
did not yield an adequate examination of the uterus and ovaries due to 
superimposed distended gas-filled loops of bowel. IMPRESSION: 1. Sonographically
unremarkable uterus and normal-appearing right ovary. 2. Likely hemorrhagic 
follicle involving the left ovary. The size of the left ovary appears relatively
stable since 2016. 3. No free fluid. DICTATED BY: KATIUSKA SMITH MD  DATE/
TIME DICTATED:17 :RAD.PRATHER  DATE/TIME TRANSCRIBED:
17 CONFIDENTIAL, DO NOT COPY WITHOUT APPROPRIATE AUTHORIZATION.  <
Electronically signed in Other Vendor System>                                   
                                                    SIGNED BY: KATIUSKA SMITH MD
17 1202
(YA RANKIN PA-C)
Differential Diagnosis: CELLULITIS, PSORIASIS, VASCULITIS, TICK BORNE ILLNESS, 
NECROTIZING FASCIITIS
Initial ED EKG: none
(ROSA VICKERS DO)
 
Departure
 
Departure
Disposition: STILL A PATIENT
Condition: Stable
Clinical Impression
Primary Impression: Female pelvic pain
Secondary Impressions: Hemorrhagic cyst of left ovary, Rash and nonspecific skin
eruption, Yeast infection
Referrals:
JODI BARRERA MD (PCP/Family)
 
Departure Forms:
Customer Survey
General Discharge Information
Prescriptions:
Current Visit Scripts
Doxycycline Hyclate 1 TAB PO BID  
     #20 TAB 
 
Hydrocortisone 1 KENDRA TOP BID  
     #30 GM 
     apply to affected area(s)
 
Fluconazole (Diflucan) 1 TAB PO ONCE  
     #2 TAB 
 
Oxycodone HCl/Acetaminophen (Percocet 5-325 MG Tablet) 1 TAB PO BID PRN pain 
     #8 TAB 
 
 
(YA RANKIN PA-C)
 
Observation Note
Spoke With:
ROSA VICKERS DO
Physician Advisor Notified: ROSA VICKERS DO
Place Patient In: ED Observation
Rationale for Observation:
My rational for observation is as follows [the patient needs IV fluids, IV 
insulin, IV antibiotics, reevaluation of the rash;  perform serial exams, repeat
CBCs and draw lactic acid level].
 
 
 
THE PATIENT WAS SIGNED OUT TO DR CORDERO AT 7 PM
 
 
PA/NP Co-Sign Statement
Statement:
ED Attending supervision documentation-
 
[x] I saw and evaluated the patient. I have also reviewed all the pertinent lab 
results and diagnostic results. I agree with the findings and the plan of care 
as documented in the PA's/NP's documentation. 
 
[] I have reviewed the ED Record and agree with the PA's/NP's documentation.
 
[] Additions or exceptions (if any) to the PAs/NP's note and plan are 
summarized below:
[]
 
(ROSA VICKERS DO)
 
Admission Note
Spoke With:
ARGELIA HINKLE MD
Documentation of Exam:
Documentation of any treatments & extenuating circumstances including Concerns 
Regarding Discharge (functional status, medication knowledge or non-compliance, 
living conditions, etc.) that warrant an admission rather than observation: 
 
see below... pt with worsening rash, not better after steroids/abx... see below.
pt merits iv abx, id and possibly derm consult. 
 
(CONSUELO SERRANO,CRYSTAL EASON)
 
Critical Care Note
 
Critical Care Note
Critical Care Time: 30-74 min
(CONSUELO SERRANO,CRYSTAL EASON)
 
ED Attending Observation
Initial Observation Note:
I have seen and personally examined NARENDRA OBRIEN 
on 17 at 1616. 
 
I agree with the current emergency department documentation.  The disposition 
(admission or discharge) is uncertain at this time, she needs a period of 
observation for the following reason(s): [The patient is being placed in 
observation and observed for progression of the rash and serial examinations and
repeat CBC and monitoring of his temperature]
 
The ED Nurse caring for this patient has been personally informed as to what the
patient is being observed for.
 
Observation Re-Evaluation:
I have reevaluated NARENDRA OBRIEN on 17 at 1826.
 
The physical findings that support the continued need to observe this patient 
include [the patient's rash is progressing upper abdomen.  Surgical consultation
was requested with Dereck Lawson MD to exclude any possibility of 
necrotizing fasciitis.  Repeat CBCs and lactic acid were drawn.].
 
(ROSA VICKERS DO)
Observation Re-Evaluation:
I have reevaluated NARENDRA OBRIEN on 17 at 1948.
 
The physical findings that support the continued need to observe this patient 
include .... Pt has a worsening rash after steroids and doxycycline, subjective 
fevers.... pt merits admission, id consult (given multiple drug allergies), 
consider derm consult. 
 
(CONSUELO SERRANO,CRYSTAL EASON)

## 2017-06-26 VITALS — DIASTOLIC BLOOD PRESSURE: 70 MMHG | SYSTOLIC BLOOD PRESSURE: 138 MMHG

## 2017-06-26 VITALS — SYSTOLIC BLOOD PRESSURE: 142 MMHG | DIASTOLIC BLOOD PRESSURE: 90 MMHG

## 2017-06-26 VITALS — DIASTOLIC BLOOD PRESSURE: 78 MMHG | SYSTOLIC BLOOD PRESSURE: 108 MMHG

## 2017-06-26 LAB
ABSOLUTE GRANULOCYTE CT: 10.9 /CUMM (ref 1.4–6.5)
BASOPHILS # BLD: 0 /CUMM (ref 0–0.2)
BASOPHILS NFR BLD: 0 % (ref 0–2)
EOSINOPHIL # BLD: 0 /CUMM (ref 0–0.7)
EOSINOPHIL NFR BLD: 0 % (ref 0–5)
ERYTHROCYTE [DISTWIDTH] IN BLOOD BY AUTOMATED COUNT: 13.1 % (ref 11.5–14.5)
GRANULOCYTES NFR BLD: 89.6 % (ref 42.2–75.2)
HCT VFR BLD CALC: 45.1 % (ref 37–47)
LYMPHOCYTES # BLD: 0.9 /CUMM (ref 1.2–3.4)
MCH RBC QN AUTO: 31.7 PG (ref 27–31)
MCHC RBC AUTO-ENTMCNC: 33.7 G/DL (ref 33–37)
MCV RBC AUTO: 93.9 FL (ref 81–99)
MONOCYTES # BLD: 0.3 /CUMM (ref 0.1–0.6)
PLATELET # BLD: 180 /CUMM (ref 130–400)
PMV BLD AUTO: 10 FL (ref 7.4–10.4)
RED BLOOD CELL CT: 4.8 /CUMM (ref 4.2–5.4)
WBC # BLD AUTO: 12.2 /CUMM (ref 4.8–10.8)

## 2017-06-26 NOTE — ADMISSION CERTIFICATION
Admission Certification
 
Certification Statement
- As attending physician, I certify that at the time of
- admission, based on clinical presentation, severity of
- symptoms, need for further diagnostic testing and
- therapeutic interventions, and risk of adverse outcomes
- without in-hospital treatment, in my clinical assessment,
- this patient requires an acute hospital stay for a minimum
- of two nights or longer. I have also considered psychsocial
- factors such as support system, advanced age, financial
- issues, cognitive issues, and failed out-patient treatments,
- past re-admission history, safety of patient, and lack of
- compliance as applicable.
Specific rationale supporting this admission is:
Worsening abdominal rash

## 2017-06-26 NOTE — CONS- INFECT DISEASE
General Information and HPI
 
Consulting Request
Date of Consult: 17
Requested By:
ROSA VICKERS DO
 
Reason for Consult:
Abdominal wall rash
Source of Information: patient
History of Present Illness:
This is a 46-year-old woman with a history of diabetes, psoriasis, recurrent 
breast infections, status post left ovarian cystectomy 2-1/2 years prior to 
admission admitted on  after presenting to the emergency room with 
several hours of left lower quadrant pain associated with nausea and the 
development of a burning rash over her lower abdomen with no history of any new 
medications or unusual contact and associated with chills but no fevers.  She 
also noted a thick white vaginal discharge with pruritic labia.  On admission 
she was afebrile.  Laboratory data revealed a white blood cell count of 9000, 
glucose 313, BUN/creatinine 18 and 0.6, amylase/lipase normal, with normal liver
enzymes.  Urinalysis negative.  A transvaginal ultrasound revealed a likely 
hemorrhagic follicle involving the left ovary.  A CT of the abdomen and pelvis 
revealed a 2 x 2.5 cm heterogeneous high attenuation area in the left ovary, 
likely corresponding to a complex or hemorrhagic left ovarian cyst.  In the 
emergency room she was given 1 dose each of Doxycycline, Fluconazole and 
Solumedrol, with near resolution of her rash, and she was then placed on 
Cefazolin.  Her rash reappeared overnight and presently she describes it as a 
burning, but not pruritic, pain.  She has remained afebrile since admission.  
Her left lower quadrant pain is much improved with pain medication. 
 
Allergies/Medications
Allergies:
Coded Allergies:
Citrus And Derivatives (Severe, ANAPHYLAXIS 16)
ciprofloxacin (From CIPRO HC) (Severe, ANAPHYLAXIS 16)
hydrocortisone (From CIPRO HC) (Severe, ANAPHYLAXIS 16)
Macrolide Antibiotics (UNKNOWN 16)
adhesive tape (BLISTERS 16)
cefadroxil (UNKNOWN 16)
clindamycin (HIVES 16)
erythromycin base (UNKNOWN 16)
penicillin G (UNKNOWN 16)
pioglitazone (RASH 16)
sitagliptin (From JANUVIA) (RASH 16)
sulfamethoxazole (UNKNOWN 16)
venom-honey bee (BEE VENOM (HONEY BEE)) (HIVES 16)
morphine (NAUSEA 16)
 
Home Med List:
Doxycycline Hyclate 100 MG TABLET   1 TAB PO BID rash
Fluconazole (Diflucan) 150 MG TABLET   1 TAB PO ONCE yeast infection
Glipizide (Glipizide ER) 5 MG TAB.ER.24   2 TAB PO QPM DIABETES  (Reported)
Hydrocortisone 1 % CREAM..G.   1 KENDRA TOP BID rash
     apply to affected area(s)
Lisinopril (Prinivil) 5 MG TABLET   1 TAB PO DAILY HEART  (Reported)
Metformin HCl (Metformin HCl ER) 500 MG TAB.ER.24H   2 TAB PO BID DIABETES  (
Reported)
Oxycodone HCl/Acetaminophen (Percocet 5-325 MG Tablet) 5 MG-325 MG TABLET   1 
TAB PO BID PRN pain
Saxagliptin (Onglyza) 5 MG TABLET   1 TAB PO QPM DIABETES  (Reported)
Simvastatin (Simvastatin*) 40 MG TABLET   1 TAB PO QPM CHOLESTEROL  (Reported)
 
 
Past History
 
Travel History
Traveled to [place] past 21 day No
 
Medical History
Blood Transfusion Hx: No
Neurological: NONE
EENT: NONE
Respiratory: asthma
Gastrointestinal: NONE
Hepatic: NONE
Renal: NONE
Musculoskeletal: NONE
Psychiatric: NONE
Endocrine: diabetes
Cancer(s): RT BREAST LUMPECTOMY
GYN/Reproductive: OVARIAN CYSTS
Other Medical Hx:
Psoriasis
History of MRSA: No
History of VRE: No
History of CDIFF: No
Isolation History: Standard
 
Surgical History
Surgical History: cholecystectomy, , laminectomy (L5-S1 2012), 
status post left ovarian cystectomy 2014 L, status post I&D of the left
subareolar breast abscess 2003, status post bilateral breast reduction 
2003
 
Family History
Relations & Conditions If Any:
Relation not specified for:
  Diabetes mellitus in father
 
 
Psychosocial History
Where Do You Live? Home
Who Do You Live With? spouse
Services at Home: None
Smoking Status: Current Everyday Smoker
ETOH Use: denies use
 
Functional Ability
ADLs
Independent: dressing, eating, toileting, bathing. 
Ambulation: independent
IADLs
Independent: shopping, housework, finances, food prep, telephone, transportation
, medication admin. 
 
Review of Systems
 
Review of Systems
Cardiovascular:
Denies: chest pain. 
Respiratory:
Denies: cough, short of breath. 
GI:
Reports: nausea. 
Genitourinary:
Reports: no symptoms. 
All Other Systems: Reviewed and Negative
 
Exam & Diagnostic Data
Last 24 Hrs of Vital Signs/I&O
 Vital Signs
 
 
Date Time Temp Pulse Resp B/P B/P Pulse O2 O2 Flow FiO2
 
     Mean Ox Delivery Rate 
 
 1419 99.2 100 20 138/70  95 Room Air  
 
 1131       Room Air  
 
 1130      96 Room Air  
 
 0815  101  142/90     
 
 0800       Room Air  
 
 0720 98.0 101 20 142/90  97 Room Air  
 
 2314      96 Room Air  
 
 2234 98.1 107 20 158/98  96 Room Air  
 
 2157       Room Air  
 
 2048 97.2 94 18 142/93  95 Room Air Room Air 
 
 1700 99.1 99 22 138/72  100 Room Air  
 
 1500  100 22 132/70  99   
 
 
 Intake & Output
 
 
  1600  0800  0000
 
Intake Total  640 240
 
Output Total 500  
 
Balance -500 640 240
 
    
 
Intake, IV  400 
 
Intake, Oral  240 240
 
Output, Urine 500  
 
Patient   230 lb
 
Weight   
 
Weight   Reported by Patient
 
Measurement   
 
Method   
 
 
 
 
Physical Exam
Other Physical Findings:
She is awake and alert in no acute distress.  She is afebrile.  Skin reveals 
scattered psoriatic lesions on her extremities, trunk and back; macular papular 
rash localized over her lower abdomen, with several lesions extending laterally 
and superiorly.  HEENT exam is negative.  Neck is supple with no adenopathy.  
Lungs are clear.  Heart regular rhythm with no murmur.  Abdomen is obese, soft, 
minimally tender on palpation over the left lower quadrant, with no guarding or 
rebound, with positive bowel sounds.  Back no CVA tenderness.  Extremities no 
cyanosis, clubbing or edema.  Neuro is without focality.
 
Last 24 Hours of Lab Results:
 Laboratory Tests
 
 
 
 
 1000 0710
 
Chemistry  
 
  Sodium (137 - 145 mmol/L)  137
 
  Potassium (3.5 - 5.1 mmol/L)  4.1
 
  Chloride (98 - 107 mmol/L)  102
 
  Carbon Dioxide (22 - 30 mmol/L)  22
 
  Anion Gap (5 - 16)  14
 
  BUN (7 - 17 mg/dL)  12
 
  Creatinine (0.5 - 1.0 mg/dL)  0.5
 
  Estimated GFR (>60 ml/min)  > 60
 
  BUN/Creatinine Ratio (7 - 25 %)  24.0
 
  Hemoglobin A1c (4.2 - 5.8 %)  10.3  H
 
Hematology  
 
  CBC w Diff  NO MAN DIFF REQ
 
  WBC (4.8 - 10.8 /CUMM)  12.2  H
 
  RBC (4.20 - 5.40 /CUMM)  4.80
 
  Hgb (12.0 - 16.0 G/DL)  15.2
 
  Hct (37 - 47 %)  45.1
 
  MCV (81.0 - 99.0 FL)  93.9
 
  MCH (27.0 - 31.0 PG)  31.7  H
 
  RDW (11.5 - 14.5 %)  13.1
 
  Plt Count (130 - 400 /CUMM)  180
 
  MPV (7.4 - 10.4 FL)  10.0
 
  Gran % (42.2 - 75.2 %)  89.6  H
 
  Lymphocytes % (20.5 - 51.1 %)  7.6  L
 
  Monocytes % (1.7 - 9.3 %)  2.8
 
  Eosinophils % (0 - 5 %)  0
 
  Basophils % (0.0 - 2.0 %)  0  L
 
  Absolute Granulocytes (1.4 - 6.5 /CUMM)  10.9  H
 
  Absolute Lymphocytes (1.2 - 3.4 /CUMM)  0.9  L
 
  Absolute Monocytes (0.10 - 0.60 /CUMM)  0.3
 
  Absolute Eosinophils (0.0 - 0.7 /CUMM)  0
 
  Absolute Basophils (0.0 - 0.2 /CUMM)  0
 
  PUBS MCHC (33.0 - 37.0 G/DL)  33.7
 
Urines  
 
  Urine Color (YEL,AMB,STR) YEL 
 
  Urine Clarity (CLEAR) CLEAR 
 
  Urine pH (5.0 - 8.0) 6.0 
 
  Ur Specific Gravity (1.001 - 1.035) 1.020 
 
  Urine Protein (NEG,<30 MG/DL) NEG 
 
  Urine Ketones (NEG) 40  H 
 
  Urine Nitrite (NEG) NEG 
 
  Urine Bilirubin (NEG) NEG 
 
  Urine Urobilinogen (0.1  -  1.0 EU/dl) 0.2 
 
  Ur Leukocyte Esterase (NEG) NEG 
 
  Ur Microscopic EXAM NOT REQUIRED 
 
  Urine Hemoglobin (NEG) NEG 
 
  Urine Glucose (N MG/DL) >=1000  H 
 
 
 
 
  1900
 
Chemistry  
 
  Lactic Acid (0.7 - 2.1 mmol/L) 0.9 1.1
 
Hematology  
 
  CBC w Diff  NO MAN DIFF REQ
 
  WBC (4.8 - 10.8 /CUMM)  12.0  H
 
  RBC (4.20 - 5.40 /CUMM)  4.72
 
  Hgb (12.0 - 16.0 G/DL)  14.9
 
  Hct (37 - 47 %)  43.8
 
  MCV (81.0 - 99.0 FL)  92.8
 
  MCH (27.0 - 31.0 PG)  31.6  H
 
  RDW (11.5 - 14.5 %)  13.0
 
  Plt Count (130 - 400 /CUMM)  161
 
  MPV (7.4 - 10.4 FL)  9.6
 
  Gran % (42.2 - 75.2 %)  92.7  H
 
  Lymphocytes % (20.5 - 51.1 %)  5.1  L
 
  Monocytes % (1.7 - 9.3 %)  2.0
 
  Eosinophils % (0 - 5 %)  0.1
 
  Basophils % (0.0 - 2.0 %)  0.1
 
  Absolute Granulocytes (1.4 - 6.5 /CUMM)  11.1  H
 
  Absolute Lymphocytes (1.2 - 3.4 /CUMM)  0.6  L
 
  Absolute Monocytes (0.10 - 0.60 /CUMM)  0.2
 
  Absolute Eosinophils (0.0 - 0.7 /CUMM)  0
 
  Absolute Basophils (0.0 - 0.2 /CUMM)  0
 
  PUBS MCHC (33.0 - 37.0 G/DL)  34.0
 
 
 
Last 24 Hours of Cirilo Results:
Urine culture  pending
 
 
Diagnostic Data
Recent Imaging Findings:
Transvaginal ultrasound  revealed a likely hemorrhagic follicle involving
the left ovary.  
 
CT of the abdomen and pelvis  revealed a 2 x 2.5 cm heterogeneous high 
attenuation area in the left ovary, likely corresponding to a complex or 
hemorrhagic left ovarian cyst. 
 
 
Assessment/Plan
 
Assessment/Plan
Impression:
This is a 46-year-old woman with a history of diabetes, psoriasis, status post 
left ovarian cystectomy 2-1/2 years prior to admission admitted on  with 
the acute onset of left lower quadrant pain, felt to represent a hemorrhagic 
left ovarian cyst, and with a maculopapular, burning rash over her lower abdomen
with no history of any new medications or unusual contacts to this area, found 
to be afebrile with a normal white blood cell count and treated with various 
antibiotics and steroids.  The etiology of this rash is unclear.  It does not 
appear to be a cellulitis, and her normal temperatures and white blood cell 
count on admission would go against this. Her white blood cell count was noted 
to be elevated last evening and this morning but this is likely secondary to the
dose of Solumedrol she received in the ER.  Lyme disease is possible, but the 
rash is not suggestive of erythema chronicum migrans.  It is not in a dermatomal
pattern and there are no vesicles to suggest either herpes zoster or herpes 
simplex.  I suspect a noninfectious etiology, possibly an unusual manifestation 
of psoriasis, a contact dermatitis or perhaps a vasculitis, though it appears 
quite localized for this.
 
Suggestion:
1.  Dermatology evaluation 
2.  Consider skin biopsy
3.  GYN evaluation
4.  Consider topical or systemic steroids after dermatology input
5.  Discontinue Cefazolin and follow off antibiotics
 
 
 
Consult Acknowledgment
- Thank you for your consult request.

## 2017-06-26 NOTE — PN- HOUSESTAFF
**See Addendum**
Subjective
Follow-up For:
Skin Eruption: dermatitis vs Cellulitis 
Subjective:
Ms Thorpe was seen and examined this morning.  Resting comfortably in bed.  
Patient continues to endorse pain across the anterior abdominal wall.  Pain is 
rated as 7 out of 10 in severity.  Worse with touch.  Patient states that she 
feels a little better compared to how she did yesterday.  She denies any fever, 
chills, nausea, vomiting.  
Requests us to inform Dr. Sewell of her admission.
 
Review of Systems
Constitutional:
Reports: see HPI. 
 
Objective
Last 24 Hrs of Vital Signs/I&O
 Vital Signs
 
 
Date Time Temp Pulse Resp B/P B/P Pulse O2 O2 Flow FiO2
 
     Mean Ox Delivery Rate 
 
 1131       Room Air  
 
 1130      96 Room Air  
 
 0815  101  142/90     
 
 0800       Room Air  
 
 0720 98.0 101 20 142/90  97 Room Air  
 
 2314      96 Room Air  
 
 2234 98.1 107 20 158/98  96 Room Air  
 
 2157       Room Air  
 
 2048 97.2 94 18 142/93  95 Room Air Room Air 
 
 1700 99.1 99 22 138/72  100 Room Air  
 
 1500  100 22 132/70  99   
 
 1259 98.9 109 16 135/78  94 Room Air  
 
 
 Intake & Output
 
 
  1600  0800  0000
 
Intake Total  640 240
 
Output Total 500  
 
Balance -500 640 240
 
    
 
Intake, IV  400 
 
Intake, Oral  240 240
 
Output, Urine 500  
 
Patient   104.326 kg
 
Weight   
 
Weight   Reported by Patient
 
Measurement   
 
Method   
 
 
 
 
Physical Exam
General Appearance: Alert, Oriented X3, Cooperative, Mild Distress
Cardiovascular: Normal S1, Normal S2
Lungs: Clear to Auscultation
Abdomen: Diffuse Erythma across anterior abdominal wall. Tender to touch. Non 
weeping. Non pruritic. Non raised., No rebound tenderness, no guarding. 
Neurological: Normal Speech
Extremities: Edema
Vascular: Normal Pulses
Current Medications:
 Current Medications
 
 
  Sig/Luz Elena Start time  Last
 
Medication Dose Route Stop Time Status Admin
 
Acetaminophen 650 MG Q6P PRN 2200 AC 
 
  PO   
 
Acetaminophen 1,000 MG Q6P PRN  2200 AC 
 
  IV   0925
 
Acetaminophen 650 MG ONCE ONE  1515 DC 
 
  PO  1516  1515
 
Acetaminophen 0 .STK-MED ONE  1513 DC 
 
  PO   
 
Albuterol Sulfate 3 ML Q4P PRN  1130 AC 
 
  INH   1128
 
Albuterol Sulfate 3 ML ONCE ONE  2315 DC 
 
  INH  2316  2313
 
Atorvastatin Calcium 20 MG 1700  1700 AC 
 
  PO   
 
Cefazolin Sodium 1,000 MG IQ8  0000 AC 
 
  IV   0816
 
Diazepam 2 MG .STK-MED ONE  0113 DC 
 
  PO  0114  
 
Diazepam 2 MG ONCE ONE  2245 DC 
 
  PO  2246  0113
 
Diphenhydramine HCl 50 MG .STK-MED ONE  2241 DC 
 
  PO  2242  
 
Diphenhydramine HCl 50 MG Q6 PRN  2230 AC 
 
  PO   2243
 
Doxycycline Hyclate 100 MG ONCE ONE  2215 CAN 
 
Sodium Chloride 100 ML IV  2320  
 
Doxycycline Hyclate 100 MG ONCE ONE  1600 DC 
 
Sodium Chloride 100 ML IV  1705  1631
 
Enoxaparin Sodium 40 MG DAILY  1000 AC 
 
  SC   
 
Fluconazole 150 MG ONCE ONE  1630 DC 
 
  PO  1631  1844
 
Guaifenesin 600 MG Q12  2231 AC 
 
  PO   0814
 
Insulin Aspart 0 TIDAC  0800 AC 
 
  SC   0817
 
Insulin Detemir 10 UNITS BID  2201 AC 
 
  SC   0816
 
Insulin Human Regular 10 UNITS ONCE ONE  1600 DC 
 
  IV  1601  1631
 
Ketorolac  15 MG Q6P PRN  2200 AC 
 
Tromethamine  IV   0815
 
Ketorolac  0 .STK-MED ONE  1156 DC 
 
Tromethamine  .ROUTE   
 
Lisinopril 5 MG DAILY  1000 AC 
 
  PO   0815
 
Methylprednisolone 0 .STK-MED ONE  1613 DC 
 
  .ROUTE   
 
Methylprednisolone 125 MG ONCE ONE  1600 DC 
 
  IV  1601  1631
 
Sodium Chloride 1,000 ML Q20H  2215 AC 
 
  IV  1814  2242
 
Sodium Chloride 1,000 ML BOLUS ONE  1145 DC 
 
  IV  1244  1200
 
 
 
 
Last 24 Hrs of Lab/Cirilo Results
Last 24 Hrs of Labs/Mics:
 Laboratory Tests
 
17 1000:
Urine Color YEL, Urine Clarity CLEAR, Urine pH 6.0, Ur Specific Gravity 1.020, 
Urine Protein NEG, Urine Ketones 40  H, Urine Nitrite NEG, Urine Bilirubin NEG, 
Urine Urobilinogen 0.2, Ur Leukocyte Esterase NEG, Ur Microscopic EXAM NOT 
REQUIRED, Urine Hemoglobin NEG, Urine Glucose >=1000  H
 
17 0710:
Anion Gap 14, Estimated GFR > 60, BUN/Creatinine Ratio 24.0, Hemoglobin A1c 10.3
 H, CBC w Diff NO MAN DIFF REQ, RBC 4.80, MCV 93.9, MCH 31.7  H, RDW 13.1, MPV 
10.0, Gran % 89.6  H, Lymphocytes % 7.6  L, Monocytes % 2.8, Eosinophils % 0, 
Basophils % 0  L, Absolute Granulocytes 10.9  H, Absolute Lymphocytes 0.9  L, 
Absolute Monocytes 0.3, Absolute Eosinophils 0, Absolute Basophils 0, PUBS MCHC 
33.7
 
17 2102:
Lactic Acid 0.9
 
17 1900:
Lactic Acid 1.1, CBC w Diff NO MAN DIFF REQ, RBC 4.72, MCV 92.8, MCH 31.6  H, 
RDW 13.0, MPV 9.6, Gran % 92.7  H, Lymphocytes % 5.1  L, Monocytes % 2.0, 
Eosinophils % 0.1, Basophils % 0.1, Absolute Granulocytes 11.1  H, Absolute 
Lymphocytes 0.6  L, Absolute Monocytes 0.2, Absolute Eosinophils 0, Absolute 
Basophils 0, PUBS MCHC 34.0
 Microbiology
 1000  URINE ROUT: Urine Culture - RECD
 1000  LOWER RESP: Respiratory Culture - COLB
 1000  LOWER RESP: Gram Stain - COLB
2255  LOWER RESP: Respiratory Culture - CAN
                Cancelled: NUMBER OF SQUAMOUS CELLS INDICATES POOR QUALITY 
SPECIMEN
2255  LOWER RESP: Gram Stain - CAN
                Cancelled: NUMBER OF SQUAMOUS CELLS INDICATES POOR QUALITY 
SPECIMEN
 
 
 
Assessment/Plan
Assessment:
Ms Thorpe is a 46 year old woman, current everyday smoker with Pmh significant
for DM, recurrent breast infections usually treated with Doxycline, history left
ovarian cyst, multiple allergies, psoriasis comes to ED for evaluation for 
Abdominal pain and rash. 
Labs significant for mildly elevated leukcocytosis, elevated serum glucose and 
glycosuria.  
 
CT ABD/pelvis pertinant for 2 x 2.5cm left hemorrhagic cyst confirming finding 
on Transvaginal US. 
 
ED course: received one dose of Doxycline, IV solumedrol and Diflucan.
 
 
Abdominal Rash and nonspecific skin eruption
Improvement noted after administration of IV solumedrol.
ID consultation obtained, will follow off antibiotics. Moniro WBC in am. 
Will also begin the patient on Clobetasol per Dermatology recomendations. 
Will continue with scheduled Benadryl 50mg q6, monitor progress
Follow up blood cultures. 
Consider Skin Biposy
 
Hemorrhagic cyst of left ovary
Per Night team  Dr. Boudreaux the on call OBG/GYN who reviewed Imaging. Recomended 
to continue conservative management and follow up with Dr. Fonseca upon discharge.
 
Dr Fonseca Informed of admission this am
Monitor for acute abdomen. 
 
Diabetes mellitus type 2
Levemir was increased to 12 units BID. 
Sliding scale was adjusted due to elevated FGG. 
Consider Endocriology consultation in am if sugras remain elevated. 
HbA13: 10.3
Nurtition consultation in am. 
 
#History of HTN and HLD
Continue lisinopril and statin 
 
#DVT prophylaxis
Lovenox
 
#Hx of tobacco use
Nicotine Patch PRN
Smoking Cessation Counselling. 
 
#Diet:
Consisten carbohydrate 3
 
#Full code status
 
Problem List:
 1. Full code status
 
 2. DVT prophylaxis
 
 3. Yeast infection
 
 4. Hemorrhagic cyst of left ovary
 
 5. Female pelvic pain
 
 6. Cellulitis of abdominal wall
 
 7. Obesity
 
Pain Ratin
Pain Location:
Anterior Abdominal Wall. 
Pain Goal: Remain pain free
Pain Plan:
Toradol 
Tomorrow's Labs & Rationales:
CBC: Monitor for infection

## 2017-06-27 VITALS — SYSTOLIC BLOOD PRESSURE: 100 MMHG | DIASTOLIC BLOOD PRESSURE: 70 MMHG

## 2017-06-27 VITALS — DIASTOLIC BLOOD PRESSURE: 62 MMHG | SYSTOLIC BLOOD PRESSURE: 118 MMHG

## 2017-06-27 LAB
ABSOLUTE GRANULOCYTE CT: 4.5 /CUMM (ref 1.4–6.5)
BASOPHILS # BLD: 0 /CUMM (ref 0–0.2)
BASOPHILS NFR BLD: 0.3 % (ref 0–2)
EOSINOPHIL # BLD: 0.1 /CUMM (ref 0–0.7)
EOSINOPHIL NFR BLD: 0.8 % (ref 0–5)
ERYTHROCYTE [DISTWIDTH] IN BLOOD BY AUTOMATED COUNT: 13.3 % (ref 11.5–14.5)
GRANULOCYTES NFR BLD: 59.9 % (ref 42.2–75.2)
HCT VFR BLD CALC: 39 % (ref 37–47)
LYMPHOCYTES # BLD: 2.3 /CUMM (ref 1.2–3.4)
MCH RBC QN AUTO: 31.9 PG (ref 27–31)
MCHC RBC AUTO-ENTMCNC: 34.2 G/DL (ref 33–37)
MCV RBC AUTO: 93.2 FL (ref 81–99)
MONOCYTES # BLD: 0.6 /CUMM (ref 0.1–0.6)
PLATELET # BLD: 185 /CUMM (ref 130–400)
PMV BLD AUTO: 9.6 FL (ref 7.4–10.4)
RED BLOOD CELL CT: 4.18 /CUMM (ref 4.2–5.4)
WBC # BLD AUTO: 7.5 /CUMM (ref 4.8–10.8)

## 2017-06-27 NOTE — PATIENT DISCHARGE INSTRUCTIONS
Discharge Instructions
 
General Discharge Information
You were seen/treated for:
Skin Rash 
Watch for these problems:
If you feel weak, experience chest pain, shortness breath, worsening cough or 
increased weakness please come back to the emergency department.
Other signs to watch out for include: fever, chills, nausea and vomiting. 
Special Instructions:
Please follow-up with your primary care physician within 7 days of discharge.  
Please inform your primary care physician of this admission to the hospital.  
Please follow up with the dermatologist, we have provided you with a referral. 
You have an appointment booked with Dr Sparks on 6/27/2017.
Please follow up with the endocrinologist we have provided you with a referral. 
Call the office on Thursday (6/29) to report blood sugars to Dr Malagon. 
 
 
Diet
Recommended Diet: Diabetic
 
Activity
Full Activity/No Limits: No
Activity Self Limited: Yes (As Tolerated )
 
Acute Coronary Syndrome
 
Inclusion Criteria
At DC or during hospital stay patient has or had the following:
ACS DIAGNOSIS No
 
Discharge Core Measures
Meds if any: Prescribed or Continued at Discharge
Meds if any: NOT Prescribed or Continued at Discharge
 
Congestive Heart Failure
 
Inclusion Criteria
At DC or during hospital stay patient has or had the following:
CHF DIAGNOSIS No
 
Discharge Core Measures
Meds if any: Prescribed or Continued at Discharge
Meds if any: NOT Prescribed or Continued at Discharge
 
Cerebrovascular accident
 
Inclusion Criteria
At DC or during hospital stay patient has or had the following:
CVA/TIA Diagnosis No
 
Discharge Core Measures
Meds if any: Prescribed or Continued at Discharge
Meds if any: NOT Prescribed or Continued at Discharge
 
Venous thromboembolism
 
Inclusion Criteria
VTE Diagnosis No
VTE Type NONE
VTE Confirmed by (Test) NONE
 
Discharge Core Measures
- Per Current guidelines, there needs to be overlap
- treatment for the first 5 days of Warfarin therapy.
- If discharged on Warfarin prior to 5 days of
- overlap therapy, the patient will need to be
- assessed for post discharge needs including
- *Post discharge parental anticoagulation
- *Warfarin and/or parental anticoagulation education
- *Follow up date to check INR post discharge
At least 5 days overlap therapy as Inpatient No
Meds if any: Prescribed or Continued at Discharge
Note: Overlap Therapy is Warfarin and Anticoagulant
Meds if any: NOT Prescribed or Continued at Discharge

## 2017-06-27 NOTE — CONS- ENDOCRINOLOGY
General Information and HPI
 
Consulting Request
Date of Consult: 17
Requested By: medical team
Reason for Consult:
management of DM type 2
Source of Information: patient, old records
Exam Limitations: no limitations
History of Present Illness:
45 y/o female, hx of DM type 2, was admitted for burning rash over her lower 
abdomen. The work-up was done and she was put on prednisone 40 mg daily this 
morning and then she is going to be discharged today. Patient will see a 
dermatologist this afternoon.
 
As home, she was on Metformin 1000 mg twice  a day, Glipizide 10 mg daily and 
Onglyza 5 mg daily. As per patient, her most recent HbA1c was more than 10%.
 
In hospital, she was put on Levemir 12 units twice a day. Novolog coverage 
before meals and Novolog coverage at bedtime. Her FSGs were 310, 223, 259 and 
241.
 
 
 
Allergies/Medications
Allergies:
Coded Allergies:
Citrus And Derivatives (Severe, ANAPHYLAXIS 16)
ciprofloxacin (From [facility] ) (Severe, ANAPHYLAXIS 16)
hydrocortisone (From [facility]) (Severe, ANAPHYLAXIS 16)
Macrolide Antibiotics (UNKNOWN 16)
adhesive tape (BLISTERS 16)
cefadroxil (UNKNOWN 16)
clindamycin (HIVES 16)
erythromycin base (UNKNOWN 16)
penicillin G (UNKNOWN 16)
pioglitazone (RASH 16)
sitagliptin (From [facility]) (RASH 16)
sulfamethoxazole (UNKNOWN 16)
venom-honey bee (BEE VENOM (HONEY BEE)) (HIVES 16)
morphine (NAUSEA 16)
Uncoded Allergies:
CITRUS FRUIT (Severe, ANAPHYLAXIS 17)
 
Home Med List:
Blood Sugar Diagnostic (Test Strips) 1 EACH STRIP   0 SC SEE ADMIN CRITERIA 
Diabetes 
Fluconazole (Diflucan) 150 MG TABLET   1 TAB PO ONCE yeast infection
Glipizide (Glipizide ER) 5 MG TAB.ER.24   2 TAB PO QPM DIABETES  (Reported)
Hydrocortisone 1 % CREAM..G.   1 KENDRA TOP BID rash
     apply to affected area(s)
Insulin Detemir (Levemir Flextouch) 100 UNIT/ML (3 ML) INSULN.PEN   20 UNIT SC 
BID Diabetes
Insulin Lispro (Humalog Kwikpen U-100) 100 UNIT/ML INSULN.PEN   0 SC SEE ADMIN 
CRITERIA Diabetes
     .
Lancets (Accu-Chek Softclix) 1 EACH EACH   0 SC SEE ADMIN CRITERIA Diabetes 
Lisinopril (Prinivil) 5 MG TABLET   1 TAB PO DAILY HEART  (Reported)
Metformin HCl (Metformin HCl ER) 500 MG TAB.ER.24H   2 TAB PO BID DIABETES  (
Reported)
Oxycodone HCl/Acetaminophen (Percocet 5-325 MG Tablet) 5 MG-325 MG TABLET   1 
TAB PO BID PRN pain
Prednisone 10 MG TABLET   1 TAB PO DAILY Rash
     on , take 3 tablets
     : take 2 tab
     :take 1 tab
Saxagliptin (Onglyza) 5 MG TABLET   1 TAB PO QPM DIABETES  (Reported)
Simvastatin (Simvastatin*) 40 MG TABLET   1 TAB PO QPM CHOLESTEROL  (Reported)
 
 
Review of Systems
 
Review of Systems
Constitutional:
Reports: see HPI. 
Cardiovascular:
Denies: chest pain. 
Respiratory:
Denies: short of breath. 
GI:
Reports: see HPI (abdominal wall burning pain). 
Neurological/Psychological:
Denies: no symptoms. 
Hematologic/Endocrine:
Reports: no symptoms. 
 
Past History
 
Travel History
Traveled to [place] past 21 day No
 
Medical History
Blood Transfusion Hx: No
Neurological: NONE
EENT: NONE
Respiratory: asthma
Gastrointestinal: NONE
Hepatic: NONE
Renal: NONE
Musculoskeletal: NONE
Psychiatric: NONE
Endocrine: diabetes
Cancer(s): RT BREAST LUMPECTOMY
GYN/Reproductive: OVARIAN CYSTS
Other Medical Hx:
Psoriasis
 
Surgical History
Surgical History: cholecystectomy, , laminectomy (L5-S1 2012), 
status post left ovarian cystectomy 2014 L status post I&D of the left 
subareolar breast abscess 2003 status post bilateral breast reduction 
2003
 
Family History
Relations & Conditions If Any:
Relation not specified for:
  Diabetes mellitus in father
 
 
Psychosocial History
Where Do You Live? Home
Who Do You Live With? spouse
Services at Home: None
Smoking Status: Current Everyday Smoker
ETOH Use: denies use
 
Functional Ability
ADLs
Independent: dressing, eating, toileting, bathing. 
Ambulation: independent
IADLs
Independent: shopping, housework, finances, food prep, telephone, transportation
, medication admin. 
 
Exam & Diagnostic Data
Last 24 Hrs of Vital Signs/I&O
 Vital Signs
 
 
Date Time Temp Pulse Resp B/P B/P Pulse O2 O2 Flow FiO2
 
     Mean Ox Delivery Rate 
 
 1134      95 Room Air  
 
 0901  90  118/62     
 
 0800       Room Air  
 
 0627 97.6 79 20 100/70  94 Room Air  
 
 0051       Room Air  
 
 0000      95 Room Air  
 
 
 Intake & Output
 
 
  1600  0800  0000
 
Intake Total 600 170 480
 
Output Total   
 
Balance 600 170 480
 
    
 
Intake, IV  120 
 
Intake, Oral 600 50 480
 
Number  0 
 
Bowel   
 
Movements   
 
 
 
 
Physical Exam
General Appearance: no apparent distress
Respiratory: lungs clear
Cardiovascular: regular rate/rhythm
Gastrointestinal: non-tender
Skin: rash (over abdominal wall)
Labs/Cirilo Results:
 Laboratory Tests
 
 
 723
 
Hematology 
 
  CBC w Diff NO MAN DIFF REQ
 
  WBC (4.8 - 10.8 /CUMM) 7.5
 
  RBC (4.20 - 5.40 /CUMM) 4.18  L
 
  Hgb (12.0 - 16.0 G/DL) 13.3
 
  Hct (37 - 47 %) 39.0
 
  MCV (81.0 - 99.0 FL) 93.2
 
  MCH (27.0 - 31.0 PG) 31.9  H
 
  RDW (11.5 - 14.5 %) 13.3
 
  Plt Count (130 - 400 /CUMM) 185
 
  MPV (7.4 - 10.4 FL) 9.6
 
  Gran % (42.2 - 75.2 %) 59.9
 
  Lymphocytes % (20.5 - 51.1 %) 30.9
 
  Monocytes % (1.7 - 9.3 %) 8.1
 
  Eosinophils % (0 - 5 %) 0.8
 
  Basophils % (0.0 - 2.0 %) 0.3
 
  Absolute Granulocytes (1.4 - 6.5 /CUMM) 4.5
 
  Absolute Lymphocytes (1.2 - 3.4 /CUMM) 2.3
 
  Absolute Monocytes (0.10 - 0.60 /CUMM) 0.6
 
  Absolute Eosinophils (0.0 - 0.7 /CUMM) 0.1
 
  Absolute Basophils (0.0 - 0.2 /CUMM) 0
 
  PUBS MCHC (33.0 - 37.0 G/DL) 34.2
 
 
 
 
Assessment/Plan
Assessment/Plan
45 y/o female, hx of DM type 2, was admitted for burning rash over her lower 
abdomen. The work-up was done and she was put on prednisone 40 mg daily this 
morning and then she is going to be discharged today. Patient will see a 
dermatologist this afternoon.
 
In hospital, Levemir was increased to 20 units twice a day; Novolog coverage was
adjusted ( FSG , 6 units; 151-200, 9 units; 201-250, 12 units; 251-300, 14
units, etc).
 
when she is medically stable for discharge, the discharge plan for DM:
-- Levemir 20 units twice  a day;
-- Metformin 1000 mg twice  a day, Glipizide 10 mg daily and Onglyza 5 mg daily;
--Novolog coverage before meals when her FSG is more than 150 ( -200, 4 
units; 201-250, 6 units; 251-300, 8 units; 301-350, 10 units; 351-400, 12 units;
> 400, 14 units)
-- monitor FSGs x 4 times a day
-- f/u in office after discharge.
 
 
Consult Acknowledgment
- Thank you for your consult request.

## 2017-06-27 NOTE — PN- HOUSESTAFF
**See Addendum**
Subjective
Follow-up For:
Skin Rash 
Subjective:
Ms. Thorpe was seen and examined this morning.  Resting comfortably in bed.  
She appears very upset and states that she feels like not enough is being done 
for her.  She continues to express concern about her abdominal rash feels that 
she's not had any follow-up from the dermatologist.   She still feels like she 
had "hot water poured in her anterior abdominal wall."  She denies any fever, 
chills, nausea, vomiting.  She endorses occasional chills.  Been tolerating by 
mouth intake well.
 
Review of Systems
Constitutional:
Reports: see HPI. 
 
Objective
Last 24 Hrs of Vital Signs/I&O
 Vital Signs
 
 
Date Time Temp Pulse Resp B/P B/P Pulse O2 O2 Flow FiO2
 
     Mean Ox Delivery Rate 
 
627 97.6 79 20 100/70  94 Room Air  
 
 0051       Room Air  
 
 0000      95 Room Air  
 
 2219 97.8 86 18 108/78  95   
 
 1716      96 Room Air Room Air 
 
 1419 99.2 100 20 138/70  95 Room Air  
 
 1131       Room Air  
 
 1130      96 Room Air  
 
 
 Intake & Output
 
 
  1600  0800  0000
 
Intake Total   480
 
Output Total   
 
Balance   480
 
    
 
Intake, Oral   480
 
 
 
 
Physical Exam
General Appearance: Alert, Oriented X3
Lymphatic: Axillary nl
Cardiovascular: Regular Rate, Normal S1, Normal S2
Lungs: Clear to Auscultation
Abdomen: Normal Bowel Sounds, Soft, No Tenderness, Erthema anterior abdominal 
wall. Tender to touch, Non Weeping. Non pruritis, Non Raised, No rebound 
Tenderness. No Guarding. 
Neurological: Normal Gait, Normal Speech, Strength at 5/5 X4 Ext
Current Medications:
 Current Medications
 
 
  Sig/Luz Elena Start time  Last
 
Medication Dose Route Stop Time Status Admin
 
Acetaminophen 1,000 MG .STK-MED ONE 2050 DC 
 
  IV 2051  
 
Acetaminophen 1,000 MG .STK-MED  DC 
 
    
 
Acetaminophen 650 MG Q6P PRN 2200 AC 
 
  PO   
 
Acetaminophen 1,000 MG Q6P PRN  2200 AC 
 
  IV   0300
 
Albuterol Sulfate 3 ML Q4P PRN  1130 AC 
 
  INH   0050
 
Atorvastatin Calcium 20 MG 1700  1700 AC 
 
  PO   1735
 
Cefazolin Sodium 1,000 MG IQ8  0000 DC 
 
  IV   0816
 
Clobetasol Propionate 1 KENDRA BID  2200 AC 
 
  TOP   2053
 
Diazepam 2 MG ONCE ONE  2100 DC 
 
  PO  2101  2145
 
Diphenhydramine HCl 50 MG Q6 PRN  2230 AC 
 
  PO   0050
 
Enoxaparin Sodium 40 MG DAILY  1000 AC 
 
  SC   
 
Guaifenesin 600 MG Q12  2231 AC 
 
  PO   2053
 
Insulin Aspart 0 AT BEDTIME  2200 AC 
 
  SC   2149
 
Insulin Aspart 4 UNITS ONCE ONE  1515 DC 
 
  SC  1516  1526
 
Insulin Aspart 0 TIDAC  0800 AC 
 
  SC   0822
 
Insulin Detemir 12 UNITS BID  2200 AC 
 
  SC   0822
 
Insulin Detemir 10 UNITS BID  2201 DC 
 
  SC   0816
 
Ketorolac  15 MG Q6P PRN  2200 AC 
 
Tromethamine  IV   0815
 
Lisinopril 5 MG DAILY  1000 AC 
 
  PO   0815
 
Patient Medication  1 ED .STK-MED ONE  1356 DC 
 
Teaching  ED  1357  
 
Prednisone 40 MG ONCE ONE  0845 AC 
 
  PO  0846  
 
Ramelteon 8 MG ONCE ONE  2200 CAN 
 
  PO  2201  
 
Sodium Chloride 1,000 ML Q20H  2215 DC 
 
  IV  1814  2242
 
 
 
 
Last 24 Hrs of Lab/Cirilo Results
Last 24 Hrs of Labs/Mics:
 Laboratory Tests
 
17 0723:
CBC w Diff Pending, WBC Pending, RBC Pending, Hgb Pending, Hct Pending, MCV 
Pending, MCH Pending, RDW Pending, Plt Count Pending, MPV Pending, PUBS MCHC 
Pending
 
17 1000:
Urine Color YEL, Urine Clarity CLEAR, Urine pH 6.0, Ur Specific Gravity 1.020, 
Urine Protein NEG, Urine Ketones 40  H, Urine Nitrite NEG, Urine Bilirubin NEG, 
Urine Urobilinogen 0.2, Ur Leukocyte Esterase NEG, Ur Microscopic EXAM NOT 
REQUIRED, Urine Hemoglobin NEG, Urine Glucose >=1000  H
 Microbiology
 1000  URINE ROUT: Urine Culture - RECD
 1000  LOWER RESP: Respiratory Culture - COLB
 1000  LOWER RESP: Gram Stain - COLB
 
 
 
Assessment/Plan
Assessment:
Ms Thorpe is a 46 year old woman, current everyday smoker with Pmh significant
for DM, recurrent breast infections usually treated with Doxycline, history left
ovarian cyst, multiple allergies, psoriasis comes to ED for evaluation for 
Abdominal pain and rash. 
Labs significant for mildly elevated leukcocytosis, elevated serum glucose and 
glycosuria.  
 
CT ABD/pelvis pertinant for 2 x 2.5cm left hemorrhagic cyst confirming finding 
on Transvaginal US. 
 
ED course: received one dose of Doxycline, IV solumedrol and Diflucan.
 
 
Abdominal Rash and nonspecific skin eruption
Improvement noted after administration of IV solumedrol. Prednisone 40 mg given 
this am, patient will be sent home on Prednisone taper, 30mg x 1, 20 mg x1 and 
10 mg x1. 
ID consultation obtained, will follow off antibiotics. WBC this AM: 7.5.
Will also begin the patient on Clobetasol per Dermatology recomendations. 
Will continue with scheduled Benadryl 50mg q6, monitor progress
Follow up blood cultures, no growth after day one. 
To be referred to Dermatology as an outpatient.  
Consider Skin Biposy
 
Hemorrhagic cyst of left ovary, #Stable
Per Night team  Dr. Boudreaux the on call OBG/GYN who reviewed Imaging. Recomended 
to continue conservative management and follow up with Dr. Fonseca upon discharge.
 
Dr Fonseca Informed of admission this am
Monitor for acute abdomen. 
 
Diabetes mellitus type 2
We Obtained a Endorinology consultation this am. 
Levemir was increased to 20 units BID. 
Sliding scale was adjusted due to elevated FGG. 
Consider Endocriology consultation in am if sugras remain elevated. 
HbA13: 10.3
FS,257, 310
Additional Home medications antiglycemics prescribed: Levemir 20 units BID and 
Humalog via SS for blood sugar > 150.
 
#History of HTN and HLD
Continue lisinopril and statin 
 
#DVT prophylaxis
Lovenox
 
#Hx of tobacco use
Nicotine Patch PRN
Smoking Cessation Counselling. 
 
#Diet:
Consistent carbohydrate 3
 
#Full code status
 
Problem List:
 1. Benign essential hypertension
 
 2. Diabetes mellitus type 2
 
 3. Hyperlipidemia
 
 4. Lumbar radiculopathy
 
 5. Cellulitis of abdominal wall
 
 6. Rash and nonspecific skin eruption
 
 7. Female pelvic pain
 
 8. Hemorrhagic cyst of left ovary
 
 9. Yeast infection
 
 10. DVT prophylaxis
 
 11. Full code status
 
 12. Obesity
 
Pain Ratin
Pain Location:
Abdominal Wall 
Pain Goal: Remain pain free
Pain Plan:
Tylenol PRN
Tomorrow's Labs & Rationales:
No Labs

## 2017-06-27 NOTE — DISCHARGE SUMMARY
Visit Information
 
Visit Dates
Admission Date:
06/25/17
 
Discharge Date:
6/27/2017
 
Hospital Course
 
Course
Attending Physician:
ANTHONY RIOS MD
 
Primary Care Physician:
BRUCE SERRANO,[facility] Course:
Ms Thorpe is a 46 year old woman, current everyday smoker with past medicat 
history significant for DM, recurrent breast infections usually treated with 
Doxycline, history left ovarian cyst, multiple allergies, psoriasis comes to ED 
for evaluation for Abdominal pain and rash.  She described this rash as having 
hot boiling water poured on her abdomen.
 
Labs significant for mildly elevated leukcocytosis, elevated serum glucose and 
glycosuria.  
 
ED course: received one dose of Doxycline, IV solumedrol and Diflucan.
 
Patient was admitted to the Gen. medical service and below is a summary of the 
care she received under us.
 
#Abdominal Rash and nonspecific skin eruption
Patient did endorse that Solu-Medrol given at the ED did help her symptoms.  On 
day 2 admission we obtained an infectious diseases consultation who recommended 
that we should follow the patient off antibiotics given the fact that she was 
afebrile and her elevated white count was likely attributed to steroids.  On day
3 of admission the patient was discharged home on a prednisone taper: 30 mg 1 
20 mg 1 10 mg x1.  She was subsequently given a referral to follow-up with the 
dermatologist on the same day.  While patient was admitted she was also given an
ointment of clobetasol for symptomatic relief.
 
#Diabetes mellitus type 2
On day 3 of admission we obtained an endocrinology consultation who suggested 
the patient should be on Levemir 20 units twice a day.  Given the fact that the 
patient's HBA1C was 10.3 and fasting sugars remained elevated, the patient was 
also continued on a sliding scale.  Prior to discharge the patient was continued
on her home anti-glycemic as well as given a new script for Levemir 20 units 
twice a day.  She was also given a prescription for Humalog to be used according
to sliding scale when her blood sugar was above 150.  The patient was instructed
to call the office of Dr. Malagon on Thursday 6/29/2017 report her blood sugars.
 
#Hemorrhagic cyst of left ovary
At the time of admission the admitting team was concerned that the patient may 
require intervention given the imaging findings.  Night team made a call to OB/
GYN service who recommended conservative management.  Dr. Sewell who had seen 
patient is seen previously was informed of this admission.
 
#History of HTN and HLD
She was maintained on lisinopril and atorvastatin.
 
#DVT prophylaxis
Patient was maintained on Lovenox.
 
Full code
She is full code. 
Allergies:
Coded Allergies:
Citrus And Derivatives (Severe, ANAPHYLAXIS 03/16/16)
ciprofloxacin (From [facility]) (Severe, ANAPHYLAXIS 03/16/16)
hydrocortisone (From [facility]) (Severe, ANAPHYLAXIS 03/16/16)
Macrolide Antibiotics (UNKNOWN 03/16/16)
adhesive tape (BLISTERS 03/16/16)
cefadroxil (UNKNOWN 03/16/16)
clindamycin (HIVES 03/16/16)
erythromycin base (UNKNOWN 03/16/16)
penicillin G (UNKNOWN 03/16/16)
pioglitazone (RASH 03/16/16)
sitagliptin (From JANUVIA) (RASH 03/16/16)
sulfamethoxazole (UNKNOWN 03/16/16)
venom-honey bee (BEE VENOM (HONEY BEE)) (HIVES 03/16/16)
morphine (NAUSEA 03/16/16)
Uncoded Allergies:
CITRUS FRUIT (Severe, ANAPHYLAXIS 06/26/17)
 
Pertinent Lab Results:
SERVICE DATE: 06/25/
EXAM TYPE: US - US-TRANSVAGINAL
 
EXAMINATION:
ULTRASOUND OF THE PELVIS
 
CLINICAL INFORMATION:
46-year-old female presented with left lower quadrant pain. History of
ovarian cyst.
 
COMPARISON:
CT of the abdomen and pelvis done on 07/15/2016.
 
TECHNIQUE:
Transabdominal and transvaginal pelvic ultrasound.
 
FINDINGS:
The uterus is normal in size and appearance, measuring 7.4 x 3.7 x 4.5 cm cm
longitudinally, anteroposteriorly and transversely. The endometrial stripe
thickness is normal, measuring 0.5 centimeters in thickness. The cervical
length measures 2.8 cm. No focal myometrial mass is seen.
 
The right ovary measures 3.2 x 1.5 x 2.5 cm, volume of 6.3 mL, shows a
dominant solitary follicle measuring 1.5 x 1.1 x 1.4 cm.
 
The left ovary measures 3.6 x 2.0 x 2.6 cm a volume of 9.8 mL. Superimposed
well-circumscribed hypoechoic echogenicity is noted within the left ovary
showing low level uniform smooth homogenous echoes measuring 2.4 x 1.4 x 2.3
cm, most consistent with hemorrhagic ovarian follicle.
 
Vascular flow to both ovaries is well maintained.
 
There is no free fluid present.
 
A transvaginal study was performed in addition to the transabdominal study
which did not yield an adequate examination of the uterus and ovaries due to
superimposed distended gas-filled loops of bowel.
 
IMPRESSION:
1. Sonographically unremarkable uterus and normal-appearing right ovary.
 
2. Likely hemorrhagic follicle involving the left ovary. The size of the left
ovary appears relatively stable since 07/16/2016.
 
3. No free fluid.
 
DICTATED BY: KATIUSKA SMITH MD 
 
SERVICE DATE: 06/25/
EXAM TYPE: CAT - CT ABD & PELVIS W IV CONTRAST
 
EXAMINATION:
CT ABDOMEN AND PELVIS WITH CONTRAST
 
CLINICAL INFORMATION:
Left lower quadrant pain.
 
COMPARISON:
Previous CT scan July 2016 and pelvic ultrasound from earlier the same day.
 
TECHNIQUE:
Multidetector volumetric imaging was performed of the abdomen and pelvis
before and after the IV administration of 95 mL of Optiray 320 intravenous
contrast. Sagittal and coronal reformatted images were obtained on the
technologist's workstation.
 
DLP:
1005 mGy-cm.
 
FINDINGS:
 
LUNG BASES: The visualized lung bases are unremarkable.
 
LIVER, GALLBLADDER, AND BILIARY TREE: The liver is normal in size, shape, and
attenuation. No focal hepatic lesion or biliary ductal dilatation is present.
The gallbladder has been removed.
 
PANCREAS: Unremarkable.
 
SPLEEN: Unremarkable.
 
ADRENAL GLANDS: Unremarkable.
 
KIDNEYS AND URETERS: There is a 2.7 x 3.2 cm cyst exophytic to the upper pole
of the left kidney. The kidneys are otherwise unremarkable.
 
BLADDER: Unremarkable.
 
GASTROINTESTINAL TRACT: The small and large bowel are unremarkable. The
appendix is not identified with certainty. No evidence of appendicitis is
seen.
 
ABDOMINAL WALL: There is a small umbilical hernia containing fat.
 
LYMPH NODES: There are no enlarged lymph nodes seen. There is no ascites.
 
VASCULAR: Unremarkable.
 
PELVIC VISCERA: There is a heterogeneous predominantly high attenuation area
in the left adnexa measuring 2 x 2.5 cm probably corresponding to hemorrhagic
cyst seen on ultrasound. The uterus and adnexa are otherwise unremarkable.
 
OSSEOUS STRUCTURES: There is spondylolysis, grade 1 spondylolisthesis and
degenerative disc disease at L5-S1.
 
IMPRESSION:
There is a 2 x 2.5 cm heterogeneous high attenuation area in the left ovary
likely corresponding to a complex or hemorrhagic left ovarian cyst as seen by
ultrasound. No evidence of diverticulosis or diverticulitis. Left renal cyst.
 
DICTATED BY: CINDY EMMANUEL MD 
 
Disposition Summary
 
Disposition
Principal Diagnosis:
Dermatitis.
Additional Diagnosis:
Diabetes.  
Elevated hemoglobin A1c.
History of HLD
history of hyperlipidemia
 
Discharge Disposition: home or self care
 
Discharge Instructions
 
General Discharge Information
Code Status: Full Code
Patient's Diet:
Consistent Carbohydrate
Patient's Activity:
As Tolerated 
Follow-Up Instructions/Appts:
Please follow-up with your primary care physician within 7 days of discharge.  
Please inform your primary care physician of this admission to the hospital.  
Please follow up with the dermatologist, we have provided you with a referral. 
You have an appointment booked with Dr Sparks on 6/27/2017.
Please follow up with the endocrinologist we have provided you with a referral. 
Call the office on Thursday (6/29) to report blood sugars to Dr Malagon. 
 
 
Medications at Discharge
Discharge Medications:
Stop taking the following medications:
Doxycycline Hyclate (Doxycycline Hyclate) 100 MG TABLET ORAL TWICE DAILY Qty = 
20
 
Continue taking these medications:
Metformin HCl (Metformin HCl ER) 500 MG TAB.ER.24H
    2 Tablet ORAL TWICE DAILY
    Qty = 360
    Comments:
       DID NOT RECEIVE WHILE IN HOSPITAL
 
Saxagliptin (Onglyza) 5 MG TABLET
    1 Tablet ORAL Every night
    Qty = 30
    Comments:
       DID NOT RECEIVE WHILE IN HOSPITAL
 
Simvastatin (Simvastatin*) 40 MG TABLET
    1 Tablet ORAL Every night
    Qty = 90
    Comments:
       Last Taken: 6/26/17
             Time: 5:30 PM
 
Glipizide (Glipizide ER) 5 MG TAB.ER.24
    2 Tablet ORAL Every night
    Qty = 68
    Comments:
       DID NOT RECEIVE WHILE IN HOSPITAL
 
Lisinopril (Prinivil) 5 MG TABLET
    1 Tablet ORAL DAILY
    Comments:
       Last Taken: 6/27/17
             Time: 9:00 AM
 
Hydrocortisone (Hydrocortisone) 1 % CREAM..G.
    1 Application On the skin TWICE DAILY
    Qty = 30
    Instructions:
       apply to affected area(s)
    Comments:
       DID NOT RECEIVE WHILE IN HOSPITAL
 
Fluconazole (Diflucan) 150 MG TABLET
    1 Tablet ORAL GIVE ONCE
    Qty = 2
    Comments:
       Last Taken: 6/27/17
             Time: 12:05 PM
 
Oxycodone HCl/Acetaminophen (Percocet 5-325 MG Tablet) 5 MG-325 MG TABLET
    1 Tablet ORAL TWICE DAILY as needed for pain
    Qty = 8
    Comments:
       DID NOT RECEIVE WHILE IN HOSPITAL
 
Start taking the following new medications:
Insulin Lispro (Humalog Kwikpen U-100) 100 UNIT/ML INSULN.PEN
    0 Inject into fatty tissue SEE INSTRUCTIONS
    Qty = 2
    No Refills
    Instructions:
       .
    Comments:
       To be used when BS is above 150. 
        
       Blood Sugar         Dose
       < 150 mg/dl          0 Units
       151-200 mg/dl        4 Units
       201-250 mg/dl        6 Units
       251-300 mg/dl        8 Units
       300-350 mg/dl        10 Units
       351-400              12 Units
       > 400                Call MD
 
Prednisone (Prednisone) 10 MG TABLET
    1 Tablet ORAL DAILY
    Qty = 6
    No Refills
    Instructions:
       on 6/28, take 3 tablets
       6/29: take 2 tab
       6/30:take 1 tab
 
Insulin Detemir (Levemir Flextouch) 100 UNIT/ML (3 ML) INSULN.PEN
    20 Unit Inject into fatty tissue TWICE DAILY
    Qty = 2
    No Refills
 
Lancets (Accu-Chek Softclix) 1 EACH EACH
    0 Inject into fatty tissue SEE INSTRUCTIONS
    Qty = 1
    No Refills
 
Blood Sugar Diagnostic (Test Strips) 1 EACH STRIP
    0 Inject into fatty tissue SEE INSTRUCTIONS
    Qty = 1
    No Refills
 
 
Copies To:
EPHRAIM SERRANO,RICHARD; BRUCE SERRANO,JODI; YONG SERRANO,MEGHANN FREEMAN; LISA SERRANO,AINSLEY EASON; ASHA SERRANO,MARIA ELENA

## 2018-05-08 ENCOUNTER — HOSPITAL ENCOUNTER (EMERGENCY)
Dept: HOSPITAL 68 - ERH | Age: 48
End: 2018-05-08
Payer: COMMERCIAL

## 2018-05-08 VITALS — DIASTOLIC BLOOD PRESSURE: 79 MMHG | SYSTOLIC BLOOD PRESSURE: 130 MMHG

## 2018-05-08 DIAGNOSIS — M71.22: Primary | ICD-10-CM

## 2018-05-08 NOTE — ULTRASOUND REPORT
EXAMINATION:
US TRIPLEX LOWER EXTREMITY, LEFT
 
CLINICAL INFORMATION:
Left lower extremity pain and swelling.
 
COMPARISON:
Left lower extremity ultrasound examinations dated 07/07/2016.
 
TECHNIQUE:
Color-flow triplex imaging with spectral analysis and compression Doppler
were performed on the left lower extremity.
 
FINDINGS:
Respiratory variation, normal compression and augmented flow are noted
throughout the lower extremity. The visualized common femoral vein, proximal
greater saphenous vein, femoral vein, profunda femoral vein, popliteal vein
and midcalf peroneal and posterior tibial venous segments show no evidence of
deep venous thrombosis.
 
There is a 6.5 x 0.9 x 2.4 cm left popliteal fossa Baker's cyst.
 
IMPRESSION:
 
1. No ultrasound evidence of deep venous thrombosis involving the lower
extremity.
 
2. A left popliteal fossa Baker's cyst is seen.

## 2018-05-08 NOTE — RADIOLOGY REPORT
EXAMINATION:
XR KNEE, LEFT
 
CLINICAL INFORMATION:
Fall. Pain. Difficulty bending. History of left knee replacement.
 
COMPARISON:
None
 
TECHNIQUE:
Four views of the left knee.
 
FINDINGS:
The patient is status post total left knee replacement with no evidence of
hardware failure or native bone fracture seen. Evaluation for knee joint
effusion is limited due to obliquity of the film, but no significant effusion
is appreciated. Small calcifications are seen along the lateral margin of the
tibia and in the knee joint.
 
IMPRESSION:
 
1. Status post total left knee arthroplasty.
2. No evidence of hardware failure or native bone fracture.
3. There may be small loose bodies within the joint space.

## 2018-05-08 NOTE — ED UPPER/LOWER EXTREMITY COMPL
History of Present Illness
 
General
Chief Complaint: Lower Extremity Problems
Stated Complaint: LFT LEG AND KNEE PAIN
Source: patient
Exam Limitations: no limitations
 
Vital Signs & Intake/Output
Vital Signs & Intake/Output
 Vital Signs
 
 
Date Time Temp Pulse Resp B/P B/P Pulse O2 O2 Flow FiO2
 
     Mean Ox Delivery Rate 
 
 1135 97.3 102 18 130/79  97   
 
 
 
Allergies
Coded Allergies:
Citrus And Derivatives (Severe, ANAPHYLAXIS 16)
ciprofloxacin (From CIPRO HC) (Severe, ANAPHYLAXIS 16)
hydrocortisone (From CIPRO HC) (Severe, ANAPHYLAXIS 16)
Macrolide Antibiotics (UNKNOWN 16)
adhesive tape (BLISTERS 16)
cefadroxil (UNKNOWN 16)
clindamycin (HIVES 16)
erythromycin base (UNKNOWN 16)
penicillin G (UNKNOWN 16)
pioglitazone (RASH 16)
saxagliptin (From ONGLYZA) (RASH-FLESH EATING 18)
sitagliptin (From JANUVIA) (RASH 16)
sulfamethoxazole (UNKNOWN 16)
venom-honey bee (BEE VENOM (HONEY BEE)) (HIVES 16)
morphine (NAUSEA 16)
Uncoded Allergies:
CITRUS FRUIT (Severe, ANAPHYLAXIS 17)
 
Reconcile Medications
Blood Sugar Diagnostic (Test Strips) 1 EACH STRIP   0 SC SEE ADMIN CRITERIA 
Diabetes 
Fluconazole (Diflucan) 150 MG TABLET   1 TAB PO ONCE yeast infection
Glipizide (Glipizide ER) 5 MG TAB.ER.24   2 TAB PO QPM DIABETES  (Reported)
Hydrocortisone 1 % CREAM..G.   1 KENDRA TOP BID rash
     apply to affected area(s)
Insulin Detemir (Levemir Flextouch) 100 UNIT/ML (3 ML) INSULN.PEN   20 UNIT SC 
BID Diabetes
Insulin Lispro (Humalog Kwikpen U-100) 100 UNIT/ML INSULN.PEN   0 SC SEE ADMIN 
CRITERIA Diabetes
     .
Lancets (Accu-Chek Softclix) 1 EACH EACH   0 SC SEE ADMIN CRITERIA Diabetes 
Lisinopril (Prinivil) 5 MG TABLET   1 TAB PO DAILY HEART  (Reported)
Metformin HCl (Metformin HCl ER) 500 MG TAB.ER.24H   2 TAB PO BID DIABETES  (
Reported)
Oxycodone HCl/Acetaminophen (Percocet 5-325 MG Tablet) 5 MG-325 MG TABLET   1 
TAB PO BID PRN pain
Prednisone 10 MG TABLET   1 TAB PO DAILY Rash
     on , take 3 tablets
     : take 2 tab
     :take 1 tab
Saxagliptin (Onglyza) 5 MG TABLET   1 TAB PO QPM DIABETES  (Reported)
Simvastatin (Simvastatin*) 40 MG TABLET   1 TAB PO QPM CHOLESTEROL  (Reported)
 
Triage Note:
PER PT FELL IN SHOWER 5 DAYS AGO, PAIN TO L KNEE
 (KNEE REPLACEMENT ) AIN AND MINOR SWELLING
 TODAY L CALF, POSTERIOR KNEE AND THIGH SWOLLEN AND
 DISCOLORED.
Triage Nurses Notes Reviewed? yes
Onset: Abrupt
Duration: day(s): (5), constant, continues in ED
Timing: recent history
Severity: moderate, severe
Pain/Injury Location:
Left: Knee. 
No Modifying Factors: none
HPI:
47-year-old female comes into the emergency room for further evaluation of left 
knee pain.  Patient reports that she was in the shower 5 days ago and fell and 
hit her knee on the faucet.  She reports that she had a prior knee replacement 4
years ago with no complications.  She reports that she was having some pain to 
the area initially but over last 24 hours she had swelling behind her knee and 
swelling down into her calf.  She denies any redness.  Comes in for further 
evaluation.
(Alejandro German)
 
Past History
 
Travel History
Traveled to [place] past 21 day No
 
Medical History
Any Pertinent Medical History? see below for history
Neurological: NONE
EENT: NONE
Respiratory: asthma
Gastrointestinal: NONE
Hepatic: NONE
Renal: NONE
Musculoskeletal: NONE
Psychiatric: NONE
Endocrine: diabetes
Cancer(s): RT BREAST LUMPECTOMY
GYN/Reproductive: OVARIAN CYSTS
Other Medical Hx:
Psoriasis
History of MRSA: No
History of VRE: No
History of CDIFF: No
 
Surgical History
Surgical History: cholecystectomy, , laminectomy (L5-S1 2012), 
status post left ovarian cystectomy 2014 L status post I&D of the left 
subareolar breast abscess 2003 status post bilateral breast reduction 
2003
 
Psychosocial History
Who do you live with Daughter
Services at Home None
What is your primary language English
Tobacco Use: Current Daily Use
Daily Tobacco Use Amount/Type: => 5 Cigarettes daily
 
Family History
Family History, If Any:
Relation not specified for:
  Diabetes mellitus in father
 
Hx Contributory? No
(Alejandro German)
 
Review of Systems
 
Review of Systems
Constitutional:
Reports: no symptoms. 
EENTM:
Reports: no symptoms. 
Respiratory:
Reports: no symptoms. 
Cardiovascular:
Reports: no symptoms. 
Gastrointestinal/Abdominal:
Reports: no symptoms. 
Genitourinary:
Reports: no symptoms. 
Musculoskeletal:
Reports: see HPI. 
Skin:
Reports: no symptoms. 
Neurological/Psychological:
Reports: no symptoms. 
Hematologic/Endocrine:
Reports: no symptoms. 
Immunological:
Reports: no symptoms. 
All Other Systems: Reviewed and Negative
(Alejandro German)
 
Physical Exam
 
Physical Exam
General Appearance: well developed/nourished, mild distress
Head: atraumatic
Eyes:
Bilateral: normal appearance. 
Ears, Nose, Throat: normal ENT inspection, hearing grossly normal
Neck: normal inspection
Cardiovascular/Respiratory: no respiratory distress
Back: normal inspection
Knee Left: limited range of motion, Tenderness over popliteal fossa, no redness,
no warmth, full range of motion of knee
Neurologic/Tendon: normal sensation, normal motor functions, normal tendon 
functions, responds to pain, no evidence tendon injury, no pulse deficit
Skin: intact, normal color, warm/dry
(Alejandro German)
 
Progress
Differential Diagnosis: contusion, dislocation, DVT, fracture, sprain
Plan of Care:
2018 2:56:37 PM
 
Patient clinically looks well.  Follow-up with orthopedic doctor.  No clinical 
evidence of septic joint.  Bakers cyst is likely source of patient's pain and 
swelling.  Understands and agrees with plan of care.  Ice.  Compression.  
Elevation.
Diagnostic Imaging:
Viewed by Me: Radiology Read, Ultrasound.  Discussed w/RAD: Radiology Read, 
Ultrasound. 
Radiology Impression: PATIENT: NARENDRA OBRIEN  MEDICAL RECORD NO: [id number] 
PRESENT AGE: 47  PATIENT ACCOUNT NO: [id number] : 70  LOCATION: [facility] 
ORDERING PHYSICIAN: Alejandro BAIRES     SERVICE DATE:  EXAM TYPE
: RAD - XRY-KNEE COMPLETE LEFT EXAMINATION: XR KNEE, LEFT CLINICAL INFORMATION: 
Fall. Pain. Difficulty bending. History of left knee replacement. COMPARISON: 
None TECHNIQUE: Four views of the left knee. FINDINGS: The patient is status 
post total left knee replacement with no evidence of hardware failure or native 
bone fracture seen. Evaluation for knee joint effusion is limited due to 
obliquity of the film, but no significant effusion is appreciated. Small 
calcifications are seen along the lateral margin of the tibia and in the knee 
joint. IMPRESSION: 1. Status post total left knee arthroplasty. 2. No evidence 
of hardware failure or native bone fracture. 3. There may be small loose bodies 
within the joint space. DICTATED BY: Frances Carmen MD  DATE/TIME DICTATED: :RAD.PRATHER  DATE/TIME TRANSCRIBED:18 
CONFIDENTIAL, DO NOT COPY WITHOUT APPROPRIATE AUTHORIZATION.  <Electronically 
signed in Other Vendor System>                                                  
                                     SIGNED BY: Frances Carmen MD 18
, PATIENT: NARENDRA OBRIEN  MEDICAL RECORD NO: [id number] PRESENT AGE: 47  
PATIENT ACCOUNT NO: [id number] : 70  LOCATION: [facility] ORDERING PHYSICIAN: 
Salo Dave DO     SERVICE DATE:  EXAM TYPE: US - US-
UNILATERAL VENOUS DOPPLER EXAMINATION: US TRIPLEX LOWER EXTREMITY, LEFT CLINICAL
INFORMATION: Left lower extremity pain and swelling. COMPARISON: Left lower 
extremity ultrasound examinations dated 2016. TECHNIQUE: Color-flow 
triplex imaging with spectral analysis and compression Doppler were performed on
the left lower extremity. FINDINGS: Respiratory variation, normal compression 
and augmented flow are noted throughout the lower extremity. The visualized 
common femoral vein, proximal greater saphenous vein, femoral vein, profunda 
femoral vein, popliteal vein and midcalf peroneal and posterior tibial venous 
segments show no evidence of deep venous thrombosis. There is a 6.5 x 0.9 x 2.4 
cm left popliteal fossa Baker's cyst. IMPRESSION: 1. No ultrasound evidence of 
deep venous thrombosis involving the lower extremity. 2. A left popliteal fossa 
Baker's cyst is seen. DICTATED BY: Glen Pompa MD  DATE/TIME DICTATED:18 :RAD.PRATHER  DATE/TIME TRANSCRIBED:18 
CONFIDENTIAL, DO NOT COPY WITHOUT APPROPRIATE AUTHORIZATION.  <Electronically 
signed in Other Vendor System>                                                  
                                     SIGNED BY: Glen Pompa 
(Alejandro German)
 
Departure
 
Departure
Disposition: HOME OR SELF CARE
Condition: Stable
Clinical Impression
Primary Impression: Baker's cyst of knee
Referrals:
Sri Thompson (PCP/Family)
 
Additional Instructions:
Follow-up with your orthopedic surgeon.  Ice.  Rest.  Compression.  Ibuprofen.  
Return if any other concerns worsening symptoms.
 
Please go over all results of today's visit with your primary care doctor.  
Contact your primary care doctor to let them know you were here in the emergency
room.  There may be nonspecific findings which may not be related to your visit 
today here in the emergency room but may require further evaluation and chronic 
monitoring by your primary care doctor.  If you had a laceration today the 
chance of foreign body always remains. You should follow-up with your primary 
care doctor for recheck in 3-5 days for a wound check.  If you had an x-ray done
there is a chance that a fracture could have been missed on initial read and you
should follow-up with your primary care doctor for repeat x-rays if symptoms 
persist.  If your blood pressure was elevated here in the emergency room please 
have rechecked by [facility] primary care doctor within the next 48.  If you were 
prescribed a narcotic here in the emergency room or any type of controlled 
substances you're not allowed to drive while taking this medication or operate 
any type of heavy machinery.  Narcotics can make you feel lightheaded dizziness 
nausea and can cause constipation.  You may need to  a stool softener.  
Thank you for choosing [facility] emergency room.  Please return to the 
emergency room immediately if you have any other concerns worsening of symptoms.
 
Departure Forms:
Customer Survey
General Discharge Information
(Alejandro German)
 
PA/NP Co-Sign Statement
Statement:
ED Attending supervision documentation-
 
[] I saw and evaluated the patient. I have also reviewed all the pertinent lab 
results and diagnostic results. I agree with the findings and the plan of care 
as documented in the PA's/NP's documentation. 
 
[X] I have reviewed the ED Record and agree with the PA's/NP's documentation.
 
[] Additions or exceptions (if any) to the PAs/NP's note and plan are 
summarized below:
[]
 
(Salo Dave DO